# Patient Record
Sex: MALE | URBAN - METROPOLITAN AREA
[De-identification: names, ages, dates, MRNs, and addresses within clinical notes are randomized per-mention and may not be internally consistent; named-entity substitution may affect disease eponyms.]

---

## 2021-05-16 ENCOUNTER — HOSPITAL ENCOUNTER (EMERGENCY)
Age: 31
Discharge: HOME HEALTH | End: 2021-05-16

## 2021-05-16 VITALS — TEMPERATURE: 97.4 F

## 2023-11-17 ENCOUNTER — APPOINTMENT (OUTPATIENT)
Dept: RADIOLOGY | Facility: HOSPITAL | Age: 33
DRG: 322 | End: 2023-11-17
Payer: COMMERCIAL

## 2023-11-17 ENCOUNTER — HOSPITAL ENCOUNTER (INPATIENT)
Facility: HOSPITAL | Age: 33
LOS: 4 days | Discharge: HOME | DRG: 322 | End: 2023-11-21
Attending: STUDENT IN AN ORGANIZED HEALTH CARE EDUCATION/TRAINING PROGRAM | Admitting: STUDENT IN AN ORGANIZED HEALTH CARE EDUCATION/TRAINING PROGRAM
Payer: COMMERCIAL

## 2023-11-17 DIAGNOSIS — I21.4 NSTEMI (NON-ST ELEVATED MYOCARDIAL INFARCTION) (MULTI): Primary | ICD-10-CM

## 2023-11-17 LAB
ALBUMIN SERPL BCP-MCNC: 4.5 G/DL (ref 3.4–5)
ALP SERPL-CCNC: 74 U/L (ref 33–120)
ALT SERPL W P-5'-P-CCNC: 57 U/L (ref 10–52)
ANION GAP SERPL CALC-SCNC: 14 MMOL/L (ref 10–20)
AST SERPL W P-5'-P-CCNC: 90 U/L (ref 9–39)
BASOPHILS # BLD AUTO: 0.08 X10*3/UL (ref 0–0.1)
BASOPHILS NFR BLD AUTO: 0.6 %
BILIRUB SERPL-MCNC: 0.6 MG/DL (ref 0–1.2)
BNP SERPL-MCNC: 153 PG/ML (ref 0–99)
BUN SERPL-MCNC: 19 MG/DL (ref 6–23)
CALCIUM SERPL-MCNC: 9 MG/DL (ref 8.6–10.3)
CARDIAC TROPONIN I PNL SERPL HS: 4042 NG/L (ref 0–20)
CARDIAC TROPONIN I PNL SERPL HS: 8304 NG/L (ref 0–20)
CHLORIDE SERPL-SCNC: 102 MMOL/L (ref 98–107)
CO2 SERPL-SCNC: 27 MMOL/L (ref 21–32)
CREAT SERPL-MCNC: 1.1 MG/DL (ref 0.5–1.3)
EOSINOPHIL # BLD AUTO: 0.26 X10*3/UL (ref 0–0.7)
EOSINOPHIL NFR BLD AUTO: 2 %
ERYTHROCYTE [DISTWIDTH] IN BLOOD BY AUTOMATED COUNT: 11.9 % (ref 11.5–14.5)
GFR SERPL CREATININE-BSD FRML MDRD: >90 ML/MIN/1.73M*2
GLUCOSE SERPL-MCNC: 92 MG/DL (ref 74–99)
HCT VFR BLD AUTO: 45.7 % (ref 41–52)
HGB BLD-MCNC: 15.5 G/DL (ref 13.5–17.5)
IMM GRANULOCYTES # BLD AUTO: 0.04 X10*3/UL (ref 0–0.7)
IMM GRANULOCYTES NFR BLD AUTO: 0.3 % (ref 0–0.9)
LIPASE SERPL-CCNC: 3 U/L (ref 9–82)
LYMPHOCYTES # BLD AUTO: 2.33 X10*3/UL (ref 1.2–4.8)
LYMPHOCYTES NFR BLD AUTO: 17.8 %
MAGNESIUM SERPL-MCNC: 2.02 MG/DL (ref 1.6–2.4)
MCH RBC QN AUTO: 29.4 PG (ref 26–34)
MCHC RBC AUTO-ENTMCNC: 33.9 G/DL (ref 32–36)
MCV RBC AUTO: 87 FL (ref 80–100)
MONOCYTES # BLD AUTO: 0.98 X10*3/UL (ref 0.1–1)
MONOCYTES NFR BLD AUTO: 7.5 %
NEUTROPHILS # BLD AUTO: 9.42 X10*3/UL (ref 1.2–7.7)
NEUTROPHILS NFR BLD AUTO: 71.8 %
NRBC BLD-RTO: 0 /100 WBCS (ref 0–0)
PLATELET # BLD AUTO: 300 X10*3/UL (ref 150–450)
POTASSIUM SERPL-SCNC: 3.5 MMOL/L (ref 3.5–5.3)
PROT SERPL-MCNC: 7.4 G/DL (ref 6.4–8.2)
RBC # BLD AUTO: 5.27 X10*6/UL (ref 4.5–5.9)
SODIUM SERPL-SCNC: 139 MMOL/L (ref 136–145)
WBC # BLD AUTO: 13.1 X10*3/UL (ref 4.4–11.3)

## 2023-11-17 PROCEDURE — 99291 CRITICAL CARE FIRST HOUR: CPT | Performed by: STUDENT IN AN ORGANIZED HEALTH CARE EDUCATION/TRAINING PROGRAM

## 2023-11-17 PROCEDURE — 2500000004 HC RX 250 GENERAL PHARMACY W/ HCPCS (ALT 636 FOR OP/ED): Performed by: STUDENT IN AN ORGANIZED HEALTH CARE EDUCATION/TRAINING PROGRAM

## 2023-11-17 PROCEDURE — B2151ZZ FLUOROSCOPY OF LEFT HEART USING LOW OSMOLAR CONTRAST: ICD-10-PCS | Performed by: INTERNAL MEDICINE

## 2023-11-17 PROCEDURE — 36415 COLL VENOUS BLD VENIPUNCTURE: CPT | Performed by: PHYSICIAN ASSISTANT

## 2023-11-17 PROCEDURE — 83735 ASSAY OF MAGNESIUM: CPT | Performed by: PHYSICIAN ASSISTANT

## 2023-11-17 PROCEDURE — 2500000001 HC RX 250 WO HCPCS SELF ADMINISTERED DRUGS (ALT 637 FOR MEDICARE OP): Performed by: STUDENT IN AN ORGANIZED HEALTH CARE EDUCATION/TRAINING PROGRAM

## 2023-11-17 PROCEDURE — 4A023N7 MEASUREMENT OF CARDIAC SAMPLING AND PRESSURE, LEFT HEART, PERCUTANEOUS APPROACH: ICD-10-PCS | Performed by: INTERNAL MEDICINE

## 2023-11-17 PROCEDURE — 71045 X-RAY EXAM CHEST 1 VIEW: CPT | Performed by: STUDENT IN AN ORGANIZED HEALTH CARE EDUCATION/TRAINING PROGRAM

## 2023-11-17 PROCEDURE — 2060000001 HC INTERMEDIATE ICU ROOM DAILY

## 2023-11-17 PROCEDURE — 2500000004 HC RX 250 GENERAL PHARMACY W/ HCPCS (ALT 636 FOR OP/ED): Performed by: PHYSICIAN ASSISTANT

## 2023-11-17 PROCEDURE — B2111ZZ FLUOROSCOPY OF MULTIPLE CORONARY ARTERIES USING LOW OSMOLAR CONTRAST: ICD-10-PCS | Performed by: INTERNAL MEDICINE

## 2023-11-17 PROCEDURE — 83880 ASSAY OF NATRIURETIC PEPTIDE: CPT | Performed by: STUDENT IN AN ORGANIZED HEALTH CARE EDUCATION/TRAINING PROGRAM

## 2023-11-17 PROCEDURE — 02703ZZ DILATION OF CORONARY ARTERY, ONE ARTERY, PERCUTANEOUS APPROACH: ICD-10-PCS | Performed by: INTERNAL MEDICINE

## 2023-11-17 PROCEDURE — 85025 COMPLETE CBC W/AUTO DIFF WBC: CPT | Performed by: PHYSICIAN ASSISTANT

## 2023-11-17 PROCEDURE — 80053 COMPREHEN METABOLIC PANEL: CPT | Performed by: PHYSICIAN ASSISTANT

## 2023-11-17 PROCEDURE — 027034Z DILATION OF CORONARY ARTERY, ONE ARTERY WITH DRUG-ELUTING INTRALUMINAL DEVICE, PERCUTANEOUS APPROACH: ICD-10-PCS | Performed by: INTERNAL MEDICINE

## 2023-11-17 PROCEDURE — 71045 X-RAY EXAM CHEST 1 VIEW: CPT | Mod: FY

## 2023-11-17 PROCEDURE — 2500000005 HC RX 250 GENERAL PHARMACY W/O HCPCS: Performed by: PHYSICIAN ASSISTANT

## 2023-11-17 PROCEDURE — 96374 THER/PROPH/DIAG INJ IV PUSH: CPT

## 2023-11-17 PROCEDURE — 2500000001 HC RX 250 WO HCPCS SELF ADMINISTERED DRUGS (ALT 637 FOR MEDICARE OP)

## 2023-11-17 PROCEDURE — 84484 ASSAY OF TROPONIN QUANT: CPT | Performed by: PHYSICIAN ASSISTANT

## 2023-11-17 PROCEDURE — 83690 ASSAY OF LIPASE: CPT | Performed by: STUDENT IN AN ORGANIZED HEALTH CARE EDUCATION/TRAINING PROGRAM

## 2023-11-17 PROCEDURE — 96376 TX/PRO/DX INJ SAME DRUG ADON: CPT

## 2023-11-17 RX ORDER — HEPARIN SODIUM 5000 [USP'U]/ML
4000 INJECTION, SOLUTION INTRAVENOUS; SUBCUTANEOUS ONCE
Status: COMPLETED | OUTPATIENT
Start: 2023-11-17 | End: 2023-11-17

## 2023-11-17 RX ORDER — NITROGLYCERIN 0.4 MG/1
0.4 TABLET SUBLINGUAL ONCE
Status: COMPLETED | OUTPATIENT
Start: 2023-11-17 | End: 2023-11-17

## 2023-11-17 RX ORDER — HEPARIN SODIUM 10000 [USP'U]/100ML
0-4000 INJECTION, SOLUTION INTRAVENOUS CONTINUOUS
Status: DISCONTINUED | OUTPATIENT
Start: 2023-11-17 | End: 2023-11-18

## 2023-11-17 RX ORDER — IBUPROFEN 200 MG
4 TABLET ORAL EVERY 6 HOURS PRN
Status: ON HOLD | COMMUNITY
End: 2023-11-21 | Stop reason: WASHOUT

## 2023-11-17 RX ORDER — NAPROXEN SODIUM 220 MG/1
324 TABLET, FILM COATED ORAL ONCE
Status: COMPLETED | OUTPATIENT
Start: 2023-11-17 | End: 2023-11-17

## 2023-11-17 RX ORDER — ACETAMINOPHEN 325 MG/1
2-3 TABLET ORAL EVERY 6 HOURS PRN
Status: ON HOLD | COMMUNITY
End: 2024-03-09 | Stop reason: WASHOUT

## 2023-11-17 RX ORDER — POLYETHYLENE GLYCOL 3350 17 G/17G
17 POWDER, FOR SOLUTION ORAL DAILY PRN
Status: DISCONTINUED | OUTPATIENT
Start: 2023-11-17 | End: 2023-11-21 | Stop reason: HOSPADM

## 2023-11-17 RX ORDER — ACETAMINOPHEN 325 MG/1
975 TABLET ORAL EVERY 6 HOURS PRN
Status: DISCONTINUED | OUTPATIENT
Start: 2023-11-17 | End: 2023-11-21 | Stop reason: HOSPADM

## 2023-11-17 RX ORDER — ATORVASTATIN CALCIUM 40 MG/1
40 TABLET, FILM COATED ORAL NIGHTLY
Status: DISCONTINUED | OUTPATIENT
Start: 2023-11-17 | End: 2023-11-18

## 2023-11-17 RX ORDER — LOSARTAN POTASSIUM 50 MG/1
25 TABLET ORAL DAILY
Status: DISCONTINUED | OUTPATIENT
Start: 2023-11-18 | End: 2023-11-21 | Stop reason: HOSPADM

## 2023-11-17 RX ORDER — HEPARIN SODIUM 5000 [USP'U]/ML
4000 INJECTION, SOLUTION INTRAVENOUS; SUBCUTANEOUS ONCE
Status: DISCONTINUED | OUTPATIENT
Start: 2023-11-17 | End: 2023-11-20

## 2023-11-17 RX ORDER — HEPARIN SODIUM 10000 [USP'U]/100ML
0-4000 INJECTION, SOLUTION INTRAVENOUS CONTINUOUS
Status: DISCONTINUED | OUTPATIENT
Start: 2023-11-17 | End: 2023-11-20

## 2023-11-17 RX ORDER — HEPARIN SODIUM 5000 [USP'U]/ML
2000-4000 INJECTION, SOLUTION INTRAVENOUS; SUBCUTANEOUS EVERY 4 HOURS PRN
Status: DISCONTINUED | OUTPATIENT
Start: 2023-11-17 | End: 2023-11-18

## 2023-11-17 RX ORDER — NAPROXEN SODIUM 220 MG/1
81 TABLET, FILM COATED ORAL DAILY
Status: DISCONTINUED | OUTPATIENT
Start: 2023-11-18 | End: 2023-11-21 | Stop reason: HOSPADM

## 2023-11-17 RX ADMIN — ATORVASTATIN CALCIUM 40 MG: 40 TABLET, FILM COATED ORAL at 23:18

## 2023-11-17 RX ADMIN — TICAGRELOR 90 MG: 90 TABLET ORAL at 20:55

## 2023-11-17 RX ADMIN — DIPHENHYDRAMINE HYDROCHLORIDE AND LIDOCAINE HYDROCHLORIDE AND ALUMINUM HYDROXIDE AND MAGNESIUM HYDRO 10 ML: KIT at 19:05

## 2023-11-17 RX ADMIN — HEPARIN SODIUM 4000 UNITS: 5000 INJECTION INTRAVENOUS; SUBCUTANEOUS at 20:58

## 2023-11-17 RX ADMIN — NITROGLYCERIN 0.4 MG: 0.4 TABLET SUBLINGUAL at 20:57

## 2023-11-17 RX ADMIN — ASPIRIN 81 MG CHEWABLE TABLET 324 MG: 81 TABLET CHEWABLE at 20:55

## 2023-11-17 RX ADMIN — HEPARIN SODIUM 1000 UNITS/HR: 10000 INJECTION, SOLUTION INTRAVENOUS at 21:08

## 2023-11-17 SDOH — SOCIAL STABILITY: SOCIAL INSECURITY: ABUSE: ADULT

## 2023-11-17 SDOH — SOCIAL STABILITY: SOCIAL INSECURITY: HAS ANYONE EVER THREATENED TO HURT YOUR FAMILY OR YOUR PETS?: NO

## 2023-11-17 SDOH — SOCIAL STABILITY: SOCIAL INSECURITY: WERE YOU ABLE TO COMPLETE ALL THE BEHAVIORAL HEALTH SCREENINGS?: YES

## 2023-11-17 SDOH — SOCIAL STABILITY: SOCIAL INSECURITY: ARE THERE ANY APPARENT SIGNS OF INJURIES/BEHAVIORS THAT COULD BE RELATED TO ABUSE/NEGLECT?: NO

## 2023-11-17 SDOH — SOCIAL STABILITY: SOCIAL INSECURITY: DO YOU FEEL ANYONE HAS EXPLOITED OR TAKEN ADVANTAGE OF YOU FINANCIALLY OR OF YOUR PERSONAL PROPERTY?: NO

## 2023-11-17 SDOH — SOCIAL STABILITY: SOCIAL INSECURITY: DO YOU FEEL UNSAFE GOING BACK TO THE PLACE WHERE YOU ARE LIVING?: NO

## 2023-11-17 SDOH — SOCIAL STABILITY: SOCIAL INSECURITY: HAVE YOU HAD THOUGHTS OF HARMING ANYONE ELSE?: NO

## 2023-11-17 SDOH — SOCIAL STABILITY: SOCIAL INSECURITY: DOES ANYONE TRY TO KEEP YOU FROM HAVING/CONTACTING OTHER FRIENDS OR DOING THINGS OUTSIDE YOUR HOME?: NO

## 2023-11-17 SDOH — SOCIAL STABILITY: SOCIAL INSECURITY: ARE YOU OR HAVE YOU BEEN THREATENED OR ABUSED PHYSICALLY, EMOTIONALLY, OR SEXUALLY BY ANYONE?: NO

## 2023-11-17 ASSESSMENT — COLUMBIA-SUICIDE SEVERITY RATING SCALE - C-SSRS
1. IN THE PAST MONTH, HAVE YOU WISHED YOU WERE DEAD OR WISHED YOU COULD GO TO SLEEP AND NOT WAKE UP?: NO
2. HAVE YOU ACTUALLY HAD ANY THOUGHTS OF KILLING YOURSELF?: NO
6. HAVE YOU EVER DONE ANYTHING, STARTED TO DO ANYTHING, OR PREPARED TO DO ANYTHING TO END YOUR LIFE?: NO

## 2023-11-17 ASSESSMENT — LIFESTYLE VARIABLES
HAVE PEOPLE ANNOYED YOU BY CRITICIZING YOUR DRINKING: NO
AUDIT-C TOTAL SCORE: 0
HOW MANY STANDARD DRINKS CONTAINING ALCOHOL DO YOU HAVE ON A TYPICAL DAY: PATIENT DOES NOT DRINK
EVER HAD A DRINK FIRST THING IN THE MORNING TO STEADY YOUR NERVES TO GET RID OF A HANGOVER: NO
SKIP TO QUESTIONS 9-10: 1
HOW OFTEN DO YOU HAVE 6 OR MORE DRINKS ON ONE OCCASION: NEVER
HOW OFTEN DO YOU HAVE A DRINK CONTAINING ALCOHOL: NEVER
REASON UNABLE TO ASSESS: NO
AUDIT-C TOTAL SCORE: 0
EVER FELT BAD OR GUILTY ABOUT YOUR DRINKING: NO
HAVE YOU EVER FELT YOU SHOULD CUT DOWN ON YOUR DRINKING: NO

## 2023-11-17 ASSESSMENT — ENCOUNTER SYMPTOMS
HEADACHES: 0
COUGH: 0
ACTIVITY CHANGE: 0
DIARRHEA: 1
CHEST TIGHTNESS: 0
NUMBNESS: 0
BACK PAIN: 0
TREMORS: 0
SHORTNESS OF BREATH: 0
FEVER: 0
LIGHT-HEADEDNESS: 0
NAUSEA: 0
PALPITATIONS: 0
ABDOMINAL DISTENTION: 0
MYALGIAS: 0
FREQUENCY: 0
HALLUCINATIONS: 0
APPETITE CHANGE: 0

## 2023-11-17 ASSESSMENT — COGNITIVE AND FUNCTIONAL STATUS - GENERAL
MOBILITY SCORE: 24
DAILY ACTIVITIY SCORE: 24
PATIENT BASELINE BEDBOUND: NO

## 2023-11-17 ASSESSMENT — PAIN - FUNCTIONAL ASSESSMENT
PAIN_FUNCTIONAL_ASSESSMENT: 0-10
PAIN_FUNCTIONAL_ASSESSMENT: 0-10

## 2023-11-17 ASSESSMENT — PAIN DESCRIPTION - FREQUENCY: FREQUENCY: INTERMITTENT

## 2023-11-17 ASSESSMENT — PATIENT HEALTH QUESTIONNAIRE - PHQ9
1. LITTLE INTEREST OR PLEASURE IN DOING THINGS: NOT AT ALL
2. FEELING DOWN, DEPRESSED OR HOPELESS: NOT AT ALL
SUM OF ALL RESPONSES TO PHQ9 QUESTIONS 1 & 2: 0

## 2023-11-17 ASSESSMENT — ACTIVITIES OF DAILY LIVING (ADL)
JUDGMENT_ADEQUATE_SAFELY_COMPLETE_DAILY_ACTIVITIES: YES
GROOMING: INDEPENDENT
ADEQUATE_TO_COMPLETE_ADL: YES
HEARING - LEFT EAR: FUNCTIONAL
WALKS IN HOME: INDEPENDENT
DRESSING YOURSELF: INDEPENDENT
FEEDING YOURSELF: INDEPENDENT
PATIENT'S MEMORY ADEQUATE TO SAFELY COMPLETE DAILY ACTIVITIES?: YES
TOILETING: INDEPENDENT
HEARING - RIGHT EAR: FUNCTIONAL
BATHING: INDEPENDENT

## 2023-11-17 ASSESSMENT — PAIN SCALES - GENERAL
PAINLEVEL_OUTOF10: 6
PAINLEVEL_OUTOF10: 7
PAINLEVEL_OUTOF10: 7
PAINLEVEL_OUTOF10: 0 - NO PAIN

## 2023-11-17 ASSESSMENT — PAIN DESCRIPTION - PAIN TYPE: TYPE: ACUTE PAIN

## 2023-11-17 ASSESSMENT — PAIN DESCRIPTION - LOCATION: LOCATION: CHEST

## 2023-11-17 ASSESSMENT — PAIN DESCRIPTION - DESCRIPTORS
DESCRIPTORS: ACHING
DESCRIPTORS: ACHING;SHARP

## 2023-11-17 NOTE — Clinical Note
Vessel: circumflex (distal). Guidewire inserted and used as the primary guidewire crossing the lesion.

## 2023-11-17 NOTE — Clinical Note
Angioplasty of the circumflex lesion. Inflation 1: Pressure = 12 lisa; Duration = 20 sec. Inflation 2: Pressure = 12 lisa; Duration = 12 sec. Kissing balloons

## 2023-11-17 NOTE — Clinical Note
Angioplasty of the proximal left anterior descending lesion. Inflation 1: Pressure = 12 lisa; Duration = 20 sec. Inflation 2: Pressure = 14 lisa; Duration = 40 sec.

## 2023-11-17 NOTE — Clinical Note
Angioplasty of the distal circumflex lesion. Inflation 1: Pressure = 12 lisa; Duration = 20 sec. Inflation 2: Pressure = 12 lisa; Duration = 12 sec. Kissing balloons

## 2023-11-17 NOTE — Clinical Note
Angioplasty of the proximal left anterior descending lesion. Inflation 1: Pressure = 22 lisa; Duration = 20 sec. Inflation 2: Pressure = 22 lisa; Duration = 20 sec.

## 2023-11-17 NOTE — Clinical Note
Angioplasty of the proximal left anterior descending lesion. Inflation 1: Pressure = 20 lisa; Duration = 16 sec. Inflation 2: Pressure = 24 lisa; Duration = 20 sec.

## 2023-11-17 NOTE — Clinical Note
Angioplasty of the proximal left anterior descending lesion. Inflation 1: Pressure = 12 lisa; Duration = 12 sec. Inflation 2: Pressure = 12 lisa; Duration = 20 sec.

## 2023-11-18 LAB
ALBUMIN SERPL BCP-MCNC: 4.3 G/DL (ref 3.4–5)
ANION GAP SERPL CALC-SCNC: 14 MMOL/L (ref 10–20)
APTT PPP: 52 SECONDS (ref 27–38)
BUN SERPL-MCNC: 16 MG/DL (ref 6–23)
CALCIUM SERPL-MCNC: 8.8 MG/DL (ref 8.6–10.3)
CARDIAC TROPONIN I PNL SERPL HS: ABNORMAL NG/L (ref 0–20)
CHLORIDE SERPL-SCNC: 100 MMOL/L (ref 98–107)
CHOLEST SERPL-MCNC: 202 MG/DL (ref 0–199)
CHOLESTEROL/HDL RATIO: 6.3
CO2 SERPL-SCNC: 27 MMOL/L (ref 21–32)
CREAT SERPL-MCNC: 1.05 MG/DL (ref 0.5–1.3)
D DIMER PPP FEU-MCNC: 347 NG/ML FEU
ERYTHROCYTE [DISTWIDTH] IN BLOOD BY AUTOMATED COUNT: 11.8 % (ref 11.5–14.5)
EST. AVERAGE GLUCOSE BLD GHB EST-MCNC: 105 MG/DL
GFR SERPL CREATININE-BSD FRML MDRD: >90 ML/MIN/1.73M*2
GLUCOSE SERPL-MCNC: 105 MG/DL (ref 74–99)
HBA1C MFR BLD: 5.3 %
HCT VFR BLD AUTO: 45.4 % (ref 41–52)
HDLC SERPL-MCNC: 32.2 MG/DL
HGB BLD-MCNC: 15.1 G/DL (ref 13.5–17.5)
LDLC SERPL CALC-MCNC: 142 MG/DL
MAGNESIUM SERPL-MCNC: 2.04 MG/DL (ref 1.6–2.4)
MCH RBC QN AUTO: 29.3 PG (ref 26–34)
MCHC RBC AUTO-ENTMCNC: 33.3 G/DL (ref 32–36)
MCV RBC AUTO: 88 FL (ref 80–100)
NON HDL CHOLESTEROL: 170 MG/DL (ref 0–149)
NRBC BLD-RTO: 0 /100 WBCS (ref 0–0)
PHOSPHATE SERPL-MCNC: 3.3 MG/DL (ref 2.5–4.9)
PLATELET # BLD AUTO: 296 X10*3/UL (ref 150–450)
POTASSIUM SERPL-SCNC: 3.7 MMOL/L (ref 3.5–5.3)
RBC # BLD AUTO: 5.16 X10*6/UL (ref 4.5–5.9)
SODIUM SERPL-SCNC: 137 MMOL/L (ref 136–145)
TRIGL SERPL-MCNC: 141 MG/DL (ref 0–149)
TSH SERPL-ACNC: 0.5 MIU/L (ref 0.44–3.98)
UFH PPP CHRO-ACNC: 0.2 IU/ML
UFH PPP CHRO-ACNC: 0.2 IU/ML
UFH PPP CHRO-ACNC: 0.4 IU/ML
VLDL: 28 MG/DL (ref 0–40)
WBC # BLD AUTO: 13.5 X10*3/UL (ref 4.4–11.3)

## 2023-11-18 PROCEDURE — 2500000001 HC RX 250 WO HCPCS SELF ADMINISTERED DRUGS (ALT 637 FOR MEDICARE OP): Performed by: NURSE PRACTITIONER

## 2023-11-18 PROCEDURE — 2500000004 HC RX 250 GENERAL PHARMACY W/ HCPCS (ALT 636 FOR OP/ED): Performed by: NURSE PRACTITIONER

## 2023-11-18 PROCEDURE — 85613 RUSSELL VIPER VENOM DILUTED: CPT | Mod: GEALAB

## 2023-11-18 PROCEDURE — 83735 ASSAY OF MAGNESIUM: CPT

## 2023-11-18 PROCEDURE — 36415 COLL VENOUS BLD VENIPUNCTURE: CPT

## 2023-11-18 PROCEDURE — 85730 THROMBOPLASTIN TIME PARTIAL: CPT | Performed by: STUDENT IN AN ORGANIZED HEALTH CARE EDUCATION/TRAINING PROGRAM

## 2023-11-18 PROCEDURE — 84484 ASSAY OF TROPONIN QUANT: CPT

## 2023-11-18 PROCEDURE — 84100 ASSAY OF PHOSPHORUS: CPT

## 2023-11-18 PROCEDURE — 85027 COMPLETE CBC AUTOMATED: CPT

## 2023-11-18 PROCEDURE — 2500000004 HC RX 250 GENERAL PHARMACY W/ HCPCS (ALT 636 FOR OP/ED)

## 2023-11-18 PROCEDURE — 85520 HEPARIN ASSAY: CPT

## 2023-11-18 PROCEDURE — 2500000001 HC RX 250 WO HCPCS SELF ADMINISTERED DRUGS (ALT 637 FOR MEDICARE OP)

## 2023-11-18 PROCEDURE — 83036 HEMOGLOBIN GLYCOSYLATED A1C: CPT | Mod: GEALAB

## 2023-11-18 PROCEDURE — 83090 ASSAY OF HOMOCYSTEINE: CPT | Mod: GEALAB

## 2023-11-18 PROCEDURE — 2060000001 HC INTERMEDIATE ICU ROOM DAILY

## 2023-11-18 PROCEDURE — 36415 COLL VENOUS BLD VENIPUNCTURE: CPT | Performed by: STUDENT IN AN ORGANIZED HEALTH CARE EDUCATION/TRAINING PROGRAM

## 2023-11-18 PROCEDURE — 85379 FIBRIN DEGRADATION QUANT: CPT

## 2023-11-18 PROCEDURE — 99223 1ST HOSP IP/OBS HIGH 75: CPT

## 2023-11-18 PROCEDURE — 99223 1ST HOSP IP/OBS HIGH 75: CPT | Performed by: NURSE PRACTITIONER

## 2023-11-18 PROCEDURE — 80061 LIPID PANEL: CPT

## 2023-11-18 PROCEDURE — 84443 ASSAY THYROID STIM HORMONE: CPT

## 2023-11-18 RX ORDER — KETOROLAC TROMETHAMINE 30 MG/ML
30 INJECTION, SOLUTION INTRAMUSCULAR; INTRAVENOUS ONCE
Status: COMPLETED | OUTPATIENT
Start: 2023-11-18 | End: 2023-11-18

## 2023-11-18 RX ORDER — MORPHINE SULFATE 2 MG/ML
2 INJECTION, SOLUTION INTRAMUSCULAR; INTRAVENOUS ONCE
Status: COMPLETED | OUTPATIENT
Start: 2023-11-18 | End: 2023-11-18

## 2023-11-18 RX ORDER — POTASSIUM CHLORIDE 20 MEQ/1
20 TABLET, EXTENDED RELEASE ORAL ONCE
Status: COMPLETED | OUTPATIENT
Start: 2023-11-18 | End: 2023-11-18

## 2023-11-18 RX ORDER — ATORVASTATIN CALCIUM 80 MG/1
80 TABLET, FILM COATED ORAL NIGHTLY
Status: DISCONTINUED | OUTPATIENT
Start: 2023-11-18 | End: 2023-11-21 | Stop reason: HOSPADM

## 2023-11-18 RX ORDER — NITROGLYCERIN 0.4 MG/1
0.4 TABLET SUBLINGUAL ONCE
Status: COMPLETED | OUTPATIENT
Start: 2023-11-18 | End: 2023-11-18

## 2023-11-18 RX ORDER — ONDANSETRON HYDROCHLORIDE 2 MG/ML
4 INJECTION, SOLUTION INTRAVENOUS EVERY 8 HOURS PRN
Status: DISCONTINUED | OUTPATIENT
Start: 2023-11-18 | End: 2023-11-21 | Stop reason: HOSPADM

## 2023-11-18 RX ORDER — ONDANSETRON 4 MG/1
4 TABLET, ORALLY DISINTEGRATING ORAL EVERY 8 HOURS PRN
Status: DISCONTINUED | OUTPATIENT
Start: 2023-11-18 | End: 2023-11-21 | Stop reason: HOSPADM

## 2023-11-18 RX ORDER — METOPROLOL TARTRATE 25 MG/1
25 TABLET, FILM COATED ORAL 2 TIMES DAILY
Status: DISCONTINUED | OUTPATIENT
Start: 2023-11-18 | End: 2023-11-21 | Stop reason: HOSPADM

## 2023-11-18 RX ADMIN — ONDANSETRON 4 MG: 2 INJECTION INTRAMUSCULAR; INTRAVENOUS at 01:02

## 2023-11-18 RX ADMIN — ASPIRIN 81 MG CHEWABLE TABLET 81 MG: 81 TABLET CHEWABLE at 08:22

## 2023-11-18 RX ADMIN — TICAGRELOR 90 MG: 90 TABLET ORAL at 08:22

## 2023-11-18 RX ADMIN — KETOROLAC TROMETHAMINE 30 MG: 30 INJECTION, SOLUTION INTRAMUSCULAR; INTRAVENOUS at 10:41

## 2023-11-18 RX ADMIN — ATORVASTATIN CALCIUM 80 MG: 80 TABLET, FILM COATED ORAL at 20:45

## 2023-11-18 RX ADMIN — TICAGRELOR 90 MG: 90 TABLET ORAL at 20:45

## 2023-11-18 RX ADMIN — LOSARTAN POTASSIUM 25 MG: 50 TABLET, FILM COATED ORAL at 08:22

## 2023-11-18 RX ADMIN — METOPROLOL TARTRATE 25 MG: 25 TABLET, FILM COATED ORAL at 20:45

## 2023-11-18 RX ADMIN — MORPHINE SULFATE 2 MG: 2 INJECTION, SOLUTION INTRAMUSCULAR; INTRAVENOUS at 06:32

## 2023-11-18 RX ADMIN — HEPARIN SODIUM 1200 UNITS/HR: 10000 INJECTION, SOLUTION INTRAVENOUS at 21:15

## 2023-11-18 RX ADMIN — METOPROLOL TARTRATE 25 MG: 25 TABLET, FILM COATED ORAL at 10:41

## 2023-11-18 RX ADMIN — MORPHINE SULFATE 2 MG: 2 INJECTION, SOLUTION INTRAMUSCULAR; INTRAVENOUS at 01:48

## 2023-11-18 RX ADMIN — POTASSIUM CHLORIDE 20 MEQ: 1500 TABLET, EXTENDED RELEASE ORAL at 15:51

## 2023-11-18 RX ADMIN — NITROGLYCERIN 0.4 MG: 0.4 TABLET SUBLINGUAL at 01:02

## 2023-11-18 ASSESSMENT — COGNITIVE AND FUNCTIONAL STATUS - GENERAL
MOBILITY SCORE: 24
DAILY ACTIVITIY SCORE: 24

## 2023-11-18 ASSESSMENT — PAIN DESCRIPTION - DESCRIPTORS: DESCRIPTORS: ACHING;DULL

## 2023-11-18 ASSESSMENT — PAIN SCALES - GENERAL
PAINLEVEL_OUTOF10: 5 - MODERATE PAIN
PAINLEVEL_OUTOF10: 0 - NO PAIN
PAINLEVEL_OUTOF10: 3

## 2023-11-18 ASSESSMENT — ACTIVITIES OF DAILY LIVING (ADL)
LACK_OF_TRANSPORTATION: NO
LACK_OF_TRANSPORTATION: NO

## 2023-11-18 ASSESSMENT — PAIN - FUNCTIONAL ASSESSMENT: PAIN_FUNCTIONAL_ASSESSMENT: 0-10

## 2023-11-18 ASSESSMENT — PAIN DESCRIPTION - LOCATION: LOCATION: CHEST

## 2023-11-18 NOTE — CARE PLAN
The patient's goals for the shift include Pt will perform ADLs without pain.    The clinical goals for the shift include Patient will have decreased chest pain during shift.      Problem: Pain - Adult  Goal: Verbalizes/displays adequate comfort level or baseline comfort level  Outcome: Progressing     Problem: Safety - Adult  Goal: Free from fall injury  Outcome: Progressing     Problem: Discharge Planning  Goal: Discharge to home or other facility with appropriate resources  Outcome: Progressing     Problem: Chronic Conditions and Co-morbidities  Goal: Patient's chronic conditions and co-morbidity symptoms are monitored and maintained or improved  Outcome: Progressing

## 2023-11-18 NOTE — ED PROVIDER NOTES
HPI   Chief Complaint   Patient presents with    Chest Pain     Started at 0200. Nausea no vomiting. No radiation to other areas.        This is a 33-year-old male coming in for pain just inferior to the sternum.  He states that this started at 4:00 in the morning as a sharp pain.  It is alleviated some at 6am and then when he went to work at 4 PM his pain started to come back.  See MDM.      History provided by:  Patient                      No data recorded                Patient History   History reviewed. No pertinent past medical history.  History reviewed. No pertinent surgical history.  No family history on file.  Social History     Tobacco Use    Smoking status: Never    Smokeless tobacco: Never   Substance Use Topics    Alcohol use: Yes    Drug use: Never       Physical Exam   ED Triage Vitals [11/17/23 1825]   Temp Heart Rate Resp BP   36.5 °C (97.7 °F) 89 16 (!) 169/111      SpO2 Temp src Heart Rate Source Patient Position   98 % -- Monitor --      BP Location FiO2 (%)     -- --       Physical Exam  Vitals and nursing note reviewed.   Constitutional:       General: He is not in acute distress.     Appearance: Normal appearance. He is not ill-appearing or toxic-appearing.   HENT:      Head: Normocephalic and atraumatic.   Eyes:      Extraocular Movements: Extraocular movements intact.      Conjunctiva/sclera: Conjunctivae normal.      Pupils: Pupils are equal, round, and reactive to light.   Cardiovascular:      Rate and Rhythm: Regular rhythm.      Pulses: Normal pulses.      Heart sounds: Normal heart sounds.   Pulmonary:      Effort: Pulmonary effort is normal. No respiratory distress.      Breath sounds: Normal breath sounds.   Abdominal:      General: Abdomen is flat. There is no distension.      Tenderness: There is abdominal tenderness in the epigastric area.      Comments: Patient is tender to the epigastric area only.   Musculoskeletal:         General: Normal range of motion.      Cervical back:  Normal range of motion and neck supple.   Skin:     General: Skin is warm and dry.   Neurological:      General: No focal deficit present.      Mental Status: He is alert and oriented to person, place, and time.   Psychiatric:         Mood and Affect: Mood normal.         Behavior: Behavior normal.         Thought Content: Thought content normal.         ED Course & [facility]   ED Course as of 11/18/23 0309 Fri Nov 17, 2023 1955 EKG completed at 1838 on my interpretation shows a normal sinus rhythm with T wave depressions on leads II, III, aVF..  Of 146 ms with a QTc of 467 ms. [RS]   2005 The patient is a 33-year-old male with no past medical history presenting with chief complaint of chest pain.  Patient's pain was actually in his epigastric area.  He did have an episode of nausea associated with it.  Pain is sharp.  Worse with palpation.  He is a low risk heart score.  States the pain began around 2 AM and has been constant.  Denies any medications or eating anything suspicious or sick contacts.  Denies any drug use or alcohol use.  Denies any black or tarry stools or blood in the stool or vomiting.  He has no short of breath no pain actually in the chest only in the epigastric area.   [WL]   2012 Troponin I, High Sensitivity(!!): 8,304 [WL]   2118 Patient reports his chest pain has greatly improved.  Hospitalist staff at bedside [RS]      ED Course User Index  [RS] Guzman Alford PA-C  [WL] Gurinder Brewer DO         Diagnoses as of 11/18/23 0309   NSTEMI (non-ST elevated myocardial infarction) (CMS/[facility])       Medical Decision Making  Summary:  Medical Decision Making:   Patient presented as described in HPI. Patient case including ROS, PE, and treatment and plan discussed with ED attending if attached as cosigner. Results from labs and or imaging included below if completed. Narciso Coates  is a 33 y.o. coming in for Patient presents with:  Chest Pain: Started at 0200. Nausea no vomiting. No radiation to other  areas.   . This is a 33-year-old male coming in for pain just inferior to the sternum.  He states that this started at 4:00 in the morning as a sharp pain.  It is alleviated some at 6am and then when he went to work at 4 PM his pain started to come back.  He states that the pain is worse to touch in the area.  He does have a history of hypertension but is not on medications for it.  He denies any other medical problems including prior history of cardiac disease or MI.  He is not diabetic.  Patient did not take anything for symptoms.  The patient's complaint work-up was completed.  He was given Magic mouthwash secondary to the pain at the epigastric area that is worse on palpation.  Lab work did show an elevated troponin greater than 8000.  Cardiology was immediately consulted.  Advised to aspirin heparin Brilinta and nitro.  These were given.  Patient's pain improved.  Advised admit to the hospitalist.  Hospital excepted patient to the stepdown unit.  He will continue to be monitored and reassessed.    I also explained the plan and treatment course. Patient/guardian is in agreement with plan, treatment course, and states verbally that they will comply.    Labs Reviewed  CBC WITH AUTO DIFFERENTIAL - Abnormal     WBC                           13.1 (*)               nRBC                          0.0                    RBC                           5.27                   Hemoglobin                    15.5                   Hematocrit                    45.7                   MCV                           87                     MCH                           29.4                   MCHC                          33.9                   RDW                           11.9                   Platelets                     300                    Neutrophils %                 71.8                   Immature Granulocytes %, Automated   0.3                    Lymphocytes %                 17.8                   Monocytes %                    7.5                    Eosinophils %                 2.0                    Basophils %                   0.6                    Neutrophils Absolute          9.42 (*)               Immature Granulocytes Absolute, Au*   0.04                   Lymphocytes Absolute          2.33                   Monocytes Absolute            0.98                   Eosinophils Absolute          0.26                   Basophils Absolute            0.08                COMPREHENSIVE METABOLIC PANEL - Abnormal     Glucose                       92                     Sodium                        139                    Potassium                     3.5                    Chloride                      102                    Bicarbonate                   27                     Anion Gap                     14                     Urea Nitrogen                 19                     Creatinine                    1.10                   eGFR                          >90                    Calcium                       9.0                    Albumin                       4.5                    Alkaline Phosphatase          74                     Total Protein                 7.4                    AST                           90 (*)                 Bilirubin, Total              0.6                    ALT                           57 (*)              SERIAL TROPONIN-INITIAL - Abnormal     Troponin I, High Sensitivity   8,304 (*)                 Narrative: Less than 99th percentile of normal range cutoff-                Female and children under 18 years old <14 ng/L; Male <21 ng/L: Negative                Repeat testing should be performed if clinically indicated.                                 Female and children under 18 years old 14-50 ng/L; Male 21-50 ng/L:                Consistent with possible cardiac damage and possible increased clinical                 risk. Serial measurements may help to assess extent of myocardial damage.                                  >50 ng/L: Consistent with cardiac damage, increased clinical risk and                myocardial infarction. Serial measurements may help assess extent of                 myocardial damage.                                  NOTE: Children less than 1 year old may have higher baseline troponin                 levels and results should be interpreted in conjunction with the overall                 clinical context.                                 NOTE: Troponin I testing is performed using a different                 testing methodology at [facility] than at other                 [facility]. Direct result comparisons should only                 be made within the same method.  MAGNESIUM - Normal     Magnesium                     2.02                TROPONIN SERIES- (INITIAL, 1 HR)       Narrative: The following orders were created for panel order Troponin I Series, High Sensitivity (0, 1 HR).                Procedure                               Abnormality         Status                                   ---------                               -----------         ------                                   Troponin I, High Sensiti...[[id number]]  Abnormal            Final result                             Troponin, High Sensitivi...[[id number]]                      In process                                               Please view results for these tests on the individual orders.  SERIAL TROPONIN, 1 HOUR  LIPASE  DRUG SCREEN,URINE   XR chest 1 view   Final Result    1.  No evidence of acute cardiopulmonary process.                MACRO:    None          Signed by: Yaw Alba 11/17/2023 6:47 PM    Dictation workstation:   [id number]          Tests/Medications/Escalations of Care considered but not given: As in MDM    Patient care discussed with: N/A  Social Determinants affecting care: N/A    Final diagnosis and disposition as documented       Hospitalize. I  discussed the differential; results and admit plan with the patient and/or family/friend/caregiver if present.  I emphasized the importance of hospitalization need for re-evaluation/continued monitoring/interventions.. They agreed that if they feel their condition is worsening or if they have any other concern they should alert staff immediately for further assistance. I gave the patient an opportunity to ask all questions they had and answered all of them accordingly. The patient and/or family/friend/caregiver expressed understanding verbally and that they would comply.    Disposition:  Hospitalize to Stepdown floor under Dr. Riley per their orders. Discussed findings and treatment done here in ED with admitting physician. It would be a risk to discharge the patient in their condition due to possibility of deterioration in their condition and the need for urgent interventions.    This note has been transcribed using voice recognition and may contain grammatical errors, misplaced words, incorrect words, incorrect phrases or other errors.          Procedure  Critical Care    Performed by: Gurinder Brewer DO  Authorized by: Gurinder Brewer DO    Critical care provider statement:     Critical care time (minutes):  31    Critical care time was exclusive of:  Separately billable procedures and treating other patients and teaching time    Critical care was necessary to treat or prevent imminent or life-threatening deterioration of the following conditions: NSTEMI.    Critical care was time spent personally by me on the following activities:  Ordering and performing treatments and interventions, ordering and review of laboratory studies, ordering and review of radiographic studies, pulse oximetry, re-evaluation of patient's condition, review of old charts, discussions with consultants, evaluation of patient's response to treatment and examination of patient    Care discussed with: admitting provider         Guzman Alford,  DAVIN  11/17/23 2043       Gurinder Brewer DO  11/18/23 0310    NELLA MACHADO DO, attests all medical record entries made are under my direction and personally dictated by me.    I interpreted all diagnostic tests    EKG was interpreted by me and documented by midlevel.    I have personally performed a substantive portion of the encounter. I saw and evaluated the patient.  I personally obtained the key and critical portions of the history and physical exam or was physically present for key and critical portions performed     ED Course as of 11/18/23 0310 Fri Nov 17, 2023 1955 EKG completed at 1838 on my interpretation shows a normal sinus rhythm with T wave depressions on leads II, III, aVF..  Of 146 ms with a QTc of 467 ms. [RS]   2005 The patient is a 33-year-old male with no past medical history presenting with chief complaint of chest pain.  Patient's pain was actually in his epigastric area.  He did have an episode of nausea associated with it.  Pain is sharp.  Worse with palpation.  He is a low risk heart score.  States the pain began around 2 AM and has been constant.  Denies any medications or eating anything suspicious or sick contacts.  Denies any drug use or alcohol use.  Denies any black or tarry stools or blood in the stool or vomiting.  He has no short of breath no pain actually in the chest only in the epigastric area.   [WL]   2012 Troponin I, High Sensitivity(!!): 8,304 [WL]   2118 Patient reports his chest pain has greatly improved.  Hospitalist staff at bedside [RS]      ED Course User Index  [RS] Guzman Alford PA-C  [WL] Gurinder Brewer DO         Diagnoses as of 11/18/23 0310   NSTEMI (non-ST elevated myocardial infarction) (CMS/[facility])     Troponin downward trending.  Was chest pain-free prior to admission.  He was started on heparin for NSTEMI.  Cardiology was consulted      Patient at bedside and discussed results and they are agreeable with the plan.    I have reviewed the chart and  changes were made to  reflect my performance of the history, physical, and assessment and plan.    Note: This note was dictated by speech recognition. Minor errors in transcription may be present         Gurinder Brewer DO  11/18/23 0315

## 2023-11-18 NOTE — PROGRESS NOTES
11/18/23 1404   Discharge Planning   Living Arrangements Spouse/significant other   Support Systems Spouse/significant other   Assistance Needed home with spouse, x3, independent with ADL's, drives, room air   Type of Residence Private residence   Number of Stairs to Enter Residence 4   Number of Stairs Within Residence 14   Do you have animals or pets at home? Yes   Type of Animals or Pets 2 cats   Who is requesting discharge planning? Provider   Home or Post Acute Services None   Patient expects to be discharged to: home with spouse, denies any c needs.   Does the patient need discharge transport arranged? No   Financial Resource Strain   How hard is it for you to pay for the very basics like food, housing, medical care, and heating? Not hard   Housing Stability   In the last 12 months, was there a time when you were not able to pay the mortgage or rent on time? N   In the last 12 months, how many places have you lived? 1   Transportation Needs   In the past 12 months, has lack of transportation kept you from medical appointments or from getting medications? no   In the past 12 months, has lack of transportation kept you from meetings, work, or from getting things needed for daily living? No

## 2023-11-18 NOTE — H&P
Internal Medicine - History and Physical Note      Patient: Narciso Coates, Age: 33 y.o., SEX: male , MRN:[id number], ROOM:[facility],  Code: Full Code   Admitted On: 11/17/2023   Admitting Dx: NSTEMI (non-ST elevated myocardial infarction) (CMS/[facility]) [I21.4]  PCP: Eli Olmos, APRN-CNP        Attending: Jonnie Riley MD        Chief Complaint   Patient: Narciso Coates is a 33 y.o. male who presented to the hospital for   Chief Complaint   Patient presents with    Chest Pain     Started at 0200. Nausea no vomiting. No radiation to other areas.      HPI   HPI: Narciso Coates is a 33 y.o. year old male  patient with no PMHx came to [facility] secondary to chest pain.  Starting last night at around 2 AM patient states that he developed substernal chest pain.  Pain was sharp in nature, 10/10 in intensity, substernal in location, nonradiating last a while and then subside.  Patient had 2 other such episodes sharp pain throughout the day, 1 at 6 AM and 1 at 4 PM.  During the 3 episodes patient states that chest pain did not completely resolve but was no longer sharp in intensity.  There was dull pressure behind his chest throughout the day.  Denies any nausea, vomiting, heartburn, leg swelling or lightheadedness.  Does not worsen with movement or deep inspiration. Did not have any previous history of such event in himself or his family.  Also denies any personal history of diabetes or hypertension.    Upon chart review, a note from PCP in March, 2020 was reviewed. It appeared that patient had hyperlipidemia that was not treated for. His Lipid panel from 03/03/2023 showed cholesterol of 235, LDL of 170, HDL of 34.2, Tgs of 154. Also has h/o elevated blood pressure from the same visit in 150/94.    ROS  Review of Systems   Constitutional:  Negative for activity change, appetite change and fever.   Respiratory:  Negative for cough, chest tightness and shortness of breath.    Cardiovascular:  Positive for chest pain.  Negative for palpitations and leg swelling.   Gastrointestinal:  Positive for diarrhea (Has had diarrhea for a long time, watery in nature, 2 episodes during the episode). Negative for abdominal distention and nausea.   Genitourinary:  Negative for frequency.   Musculoskeletal:  Negative for back pain and myalgias.   Neurological:  Negative for tremors, light-headedness, numbness and headaches.   Psychiatric/Behavioral:  Negative for behavioral problems and hallucinations.       12 Point ROS negative unless otherwise specified above.     ED COURSE:   VS: Temperature 97.7 F, /111 mmHg, heart rate 89 bpm, respiration 16, O2 sat 98%    Labs  - CBC WBC 13.1, RBC 5.27, H/H 15.5/45.7, Plt 300  - BMP Glu 92, Na 139, K 3.5, Cl 102, Bicarb 27, BUN 19, Cr 1.10  - LFTs: Alb 4.5, ALT 57, AST 90, T. Isrrael 0.6  - Lipase 3  -   - Trop I 8304 --> 4042  - EKG shows a normal sinus rhythm with T wave depressions on leads II, III, aVF, QTc of 467 m     Imaging:  CXR: Cardiomediastinal silhouette is normal in size and configuration. No consolidation, pleural effusion, or pneumothorax is evident.    Interventions: Consulted cardiology. Given brilinta 90 mg, nitroglycerin 0.4 mg once, aspirin 324 mg once.  Pt 's pain improved and he was subsequently transferred to step down    Past Medical History: None    Past Surgical History: Cholecystectomy at the age of 17 yrs    Allergies: NKDA    Home Medications: None    Family History: Stroke in mother    Social History:  - Coming from Home, lives with tiffany Mayo  - Tobacco: Vaped for 2 to 3 yrs, stopped currently  - Alcohol: Occasional, social  - Illicit Drug: Uses marijuana occasionally, last use 6 months ago    HealthCare Providers:  - PCP: Last saw a healthcare provider  in 2020.    Code Status: Full Code    Emergency contact: Maria Esther Judge, [phone]    Past Medical History     Past Medical History:   Diagnosis Date    Hypertension         Surgical History      Past Surgical History:   Procedure Laterality Date    ABDOMINAL SURGERY      gallbladder removal       Family History   No family history on file.    Social History     Social History     Socioeconomic History    Marital status: Single     Spouse name: Not on file    Number of children: Not on file    Years of education: Not on file    Highest education level: Not on file   Occupational History    Not on file   Tobacco Use    Smoking status: Never    Smokeless tobacco: Former     Types: Chew   Substance and Sexual Activity    Alcohol use: Not Currently    Drug use: Never    Sexual activity: Defer   Other Topics Concern    Not on file   Social History Narrative    Not on file     Social Determinants of Health     Financial Resource Strain: Not on file   Food Insecurity: Not on file   Transportation Needs: Not on file   Physical Activity: Not on file   Stress: Not on file   Social Connections: Not on file   Intimate Partner Violence: Not on file   Housing Stability: Not on file       Tobacco Use: Medium Risk (11/17/2023)    Patient History     Smoking Tobacco Use: Never     Smokeless Tobacco Use: Former     Passive Exposure: Not on file        Social History     Substance and Sexual Activity   Alcohol Use Not Currently        Allergies   No Known Allergies       Meds    Scheduled medications  [START ON 11/18/2023] aspirin, 81 mg, oral, Daily  atorvastatin, 40 mg, oral, Nightly  heparin, 4,000 Units, intravenous, Once  [START ON 11/18/2023] losartan, 25 mg, oral, Daily  perflutren lipid microspheres, 0.5-10 mL of dilution, intravenous, Once in imaging  perflutren protein A microsphere, 0.5 mL, intravenous, Once in imaging  sulfur hexafluoride microsphr, 2 mL, intravenous, Once in imaging  ticagrelor, 90 mg, oral, BID  ticagrelor, 90 mg, oral, BID      Continuous medications  heparin, 0-4,000 Units/hr, Last Rate: 1,000 Units/hr (11/17/23 2108)  heparin, 0-4,000 Units/hr      PRN medications  PRN medications:  "acetaminophen, heparin, polyethylene glycol     Objective    Physical Exam  Constitutional:       Appearance: Normal appearance.   HENT:      Head: Normocephalic and atraumatic.      Mouth/Throat:      Mouth: Mucous membranes are moist.   Cardiovascular:      Rate and Rhythm: Normal rate and regular rhythm.      Heart sounds: No murmur heard.     No friction rub. No gallop.   Pulmonary:      Breath sounds: Normal breath sounds. No stridor. No wheezing or rhonchi.   Abdominal:      General: Bowel sounds are normal. There is distension.      Palpations: Abdomen is soft. There is no mass.      Tenderness: There is no abdominal tenderness. There is no guarding.   Musculoskeletal:         General: No swelling or tenderness. Normal range of motion.      Cervical back: Normal range of motion.   Neurological:      General: No focal deficit present.      Mental Status: He is alert and oriented to person, place, and time.   Psychiatric:         Mood and Affect: Mood normal.         Behavior: Behavior normal.          Visit Vitals  /83 (BP Location: Right arm)   Pulse 84   Temp 36 °C (96.8 °F) (Tympanic)   Resp 18   Ht 1.778 m (5' 10\")   Wt 113 kg (249 lb 9 oz)   SpO2 98%   BMI 35.81 kg/m²   Smoking Status Never   BSA 2.36 m²        No intake or output data in the 24 hours ending 11/17/23 2251          Labs:   Results from last 72 hours   Lab Units 11/17/23  1828   SODIUM mmol/L 139   POTASSIUM mmol/L 3.5   CHLORIDE mmol/L 102   CO2 mmol/L 27   BUN mg/dL 19   CREATININE mg/dL 1.10   GLUCOSE mg/dL 92   CALCIUM mg/dL 9.0   ANION GAP mmol/L 14   EGFR mL/min/1.73m*2 >90      Results from last 72 hours   Lab Units 11/17/23  1828   WBC AUTO x10*3/uL 13.1*   HEMOGLOBIN g/dL 15.5   HEMATOCRIT % 45.7   PLATELETS AUTO x10*3/uL 300   NEUTROS PCT AUTO % 71.8   LYMPHS PCT AUTO % 17.8   MONOS PCT AUTO % 7.5   EOS PCT AUTO % 2.0      Lab Results   Component Value Date    CALCIUM 9.0 11/17/2023      No results found for: \"CRP\"    " "[unfilled]     Micro/ID:   No results found for the last 90 days.    No results found for: \"URINECULTURE\", \"BLOODCULT\", \"CSFCULTSMEAR\"                 No lab exists for component: \"AGALPCRNB\"       Images    XR chest 1 view  Narrative: Interpreted By:  Yaw Alba,   STUDY:  XR CHEST 1 VIEW;  11/17/2023 6:32 pm      INDICATION:  Signs/Symptoms:Chest Pain.      COMPARISON:  None.      ACCESSION NUMBER(S):  [id number]      ORDERING CLINICIAN:  JAYLYN ARGUETA      FINDINGS:  AP radiograph of the chest was provided.              CARDIOMEDIASTINAL SILHOUETTE:  Cardiomediastinal silhouette is normal in size and configuration.      LUNGS:  No consolidation, pleural effusion, or pneumothorax is evident.      ABDOMEN:  No remarkable upper abdominal findings.      BONES:  No acute osseous changes.      Impression: 1.  No evidence of acute cardiopulmonary process.          MACRO:  None      Signed by: Yaw Alba 11/17/2023 6:47 PM  Dictation workstation:   [id number]     No results found for this or any previous visit (from the past 4464 hour(s)).   No results found for this or any previous visit (from the past 4464 hour(s)).     Assessment and Plan    Narciso Coates is a 33 y.o. male admitted on 11/17/2023 for chest pain.  ED worked patient up.  Troponins were elevated.  EKG with T wave inversion.  Cardiology was consulted in the ED.  Recommended management per NSTEMI protocol.  Currently being worked up for the possible cause of NSTEMI in 33-year old    ACUTE MEDICAL ISSUES:    # NSTEMI r/o  # Elevated Troponin  - Sx: Substernal non-radiating chest pain  - Troponin: 8304 --> 4042. Repeat ordered  - EKG: T wave inversion in I, V1, V2, V3   - Heart score: 4 point; risk of MACE of 12-16%  - PRAKASH Score: 2 points; 8% all cause mortality  - CXR: No evidence of acute cardiopulmonary process  [ ] HbA1c ordered; pending  [ ] TSH ordered; pending  [ ] Lipid panel ordered; pending  [ ] UDS ordered; pending  [ " ] D-Dimer ordered; pending  [ ] Echo ordered; pending  -  mg once given in ED, On ASA 81 mg PO daily  - On Brillinta 90 mg BID  - Atorvastatin 40 mg at bedtime  - Added Losartan 25 mg every day  - On continuous low intensity heparin gtt per ACS guidelines  - Pt. Currently on telemetry in Stepdown  - Consulted cardiologist for further evaluation and guidance on diagnostic testing.  - Follow up outpatient w/ cardiology     IVF: None  Electrolytes: Mg >2, K >4  Diet: NPO currently for possible cath tomorrow  GI ppx: None  DVT ppx: On continuous low intensity Heparin gtt  Antibiotics: None  Lines:   Code: Full Code     Disposition: Admitted for NSTEMI, echo pending. eLOS >48Hr    Jorge Adames MD  Internal Medicine, PGY- 1  11/17/23 at 10:51 PM     Disclaimer: Documentation completed with the information available at the time of input. The times in the chart may not be reflective of actual patient care times, interventions, or procedures. Documentation occurs after the physical care of the patient.

## 2023-11-18 NOTE — PROGRESS NOTES
"Narciso Coates is a 33 y.o. male on day 1 of admission presenting with NSTEMI (non-ST elevated myocardial infarction) (CMS/[facility]).    Subjective     Patient doing well this morning. Notes he just has mild dull chest pressure and rates it as 4/10 in severity. Denies any family history of premature cardiac death. States that his mother had a stroke at 48.        Objective     Physical Exam  Constitutional:       Appearance: Normal appearance.   Eyes:      Extraocular Movements: Extraocular movements intact.      Conjunctiva/sclera: Conjunctivae normal.   Cardiovascular:      Rate and Rhythm: Normal rate and regular rhythm.   Pulmonary:      Effort: Pulmonary effort is normal. No respiratory distress.      Breath sounds: No wheezing, rhonchi or rales.   Abdominal:      Palpations: Abdomen is soft.      Tenderness: There is no abdominal tenderness.   Musculoskeletal:         General: Normal range of motion.   Skin:     General: Skin is warm and dry.   Neurological:      General: No focal deficit present.      Mental Status: He is alert and oriented to person, place, and time. Mental status is at baseline.   Psychiatric:         Mood and Affect: Mood normal.         Behavior: Behavior normal.         Last Recorded Vitals  Blood pressure 117/81, pulse 85, temperature 36.4 °C (97.5 °F), temperature source Temporal, resp. rate 17, height 1.778 m (5' 10\"), weight 113 kg (249 lb 9 oz), SpO2 94 %.  Intake/Output last 3 Shifts:  I/O last 3 completed shifts:  In: 323.7 (2.9 mL/kg) [P.O.:240; I.V.:83.7 (0.7 mL/kg)]  Out: - (0 mL/kg)   Weight: 113.2 kg     Relevant Results    No current facility-administered medications on file prior to encounter.     Current Outpatient Medications on File Prior to Encounter   Medication Sig Dispense Refill    acetaminophen (Tylenol) 325 mg tablet Take 2-3 tablets (650-975 mg) by mouth every 6 hours if needed for mild pain (1 - 3).      ibuprofen 200 mg tablet Take 4 tablets (800 mg) by mouth every " 6 hours if needed for mild pain (1 - 3).        Results for orders placed or performed during the hospital encounter of 11/17/23 (from the past 24 hour(s))   CBC and Auto Differential   Result Value Ref Range    WBC 13.1 (H) 4.4 - 11.3 x10*3/uL    nRBC 0.0 0.0 - 0.0 /100 WBCs    RBC 5.27 4.50 - 5.90 x10*6/uL    Hemoglobin 15.5 13.5 - 17.5 g/dL    Hematocrit 45.7 41.0 - 52.0 %    MCV 87 80 - 100 fL    MCH 29.4 26.0 - 34.0 pg    MCHC 33.9 32.0 - 36.0 g/dL    RDW 11.9 11.5 - 14.5 %    Platelets 300 150 - 450 x10*3/uL    Neutrophils % 71.8 40.0 - 80.0 %    Immature Granulocytes %, Automated 0.3 0.0 - 0.9 %    Lymphocytes % 17.8 13.0 - 44.0 %    Monocytes % 7.5 2.0 - 10.0 %    Eosinophils % 2.0 0.0 - 6.0 %    Basophils % 0.6 0.0 - 2.0 %    Neutrophils Absolute 9.42 (H) 1.20 - 7.70 x10*3/uL    Immature Granulocytes Absolute, Automated 0.04 0.00 - 0.70 x10*3/uL    Lymphocytes Absolute 2.33 1.20 - 4.80 x10*3/uL    Monocytes Absolute 0.98 0.10 - 1.00 x10*3/uL    Eosinophils Absolute 0.26 0.00 - 0.70 x10*3/uL    Basophils Absolute 0.08 0.00 - 0.10 x10*3/uL   Comprehensive Metabolic Panel   Result Value Ref Range    Glucose 92 74 - 99 mg/dL    Sodium 139 136 - 145 mmol/L    Potassium 3.5 3.5 - 5.3 mmol/L    Chloride 102 98 - 107 mmol/L    Bicarbonate 27 21 - 32 mmol/L    Anion Gap 14 10 - 20 mmol/L    Urea Nitrogen 19 6 - 23 mg/dL    Creatinine 1.10 0.50 - 1.30 mg/dL    eGFR >90 >60 mL/min/1.73m*2    Calcium 9.0 8.6 - 10.3 mg/dL    Albumin 4.5 3.4 - 5.0 g/dL    Alkaline Phosphatase 74 33 - 120 U/L    Total Protein 7.4 6.4 - 8.2 g/dL    AST 90 (H) 9 - 39 U/L    Bilirubin, Total 0.6 0.0 - 1.2 mg/dL    ALT 57 (H) 10 - 52 U/L   Magnesium   Result Value Ref Range    Magnesium 2.02 1.60 - 2.40 mg/dL   Troponin I, High Sensitivity, Initial   Result Value Ref Range    Troponin I, High Sensitivity 8,304 (HH) 0 - 20 ng/L   B-type natriuretic peptide   Result Value Ref Range     (H) 0 - 99 pg/mL   Troponin, High Sensitivity, 1  Hour   Result Value Ref Range    Troponin I, High Sensitivity 4,042 (HH) 0 - 20 ng/L   Lipase   Result Value Ref Range    Lipase 3 (L) 9 - 82 U/L   APTT   Result Value Ref Range    aPTT 52 (H) 27 - 38 seconds   D-dimer, Non VTE   Result Value Ref Range    D-Dimer Non VTE, Quant (ng/mL FEU) 347 <=500 ng/mL FEU   TSH   Result Value Ref Range    Thyroid Stimulating Hormone 0.50 0.44 - 3.98 mIU/L   Troponin I, High Sensitivity, Initial   Result Value Ref Range    Troponin I, High Sensitivity 14,459 (HH) 0 - 20 ng/L   Lipid Panel   Result Value Ref Range    Cholesterol 202 (H) 0 - 199 mg/dL    HDL-Cholesterol 32.2 mg/dL    Cholesterol/HDL Ratio 6.3     LDL Calculated 142 (H) <=99 mg/dL    VLDL 28 0 - 40 mg/dL    Triglycerides 141 0 - 149 mg/dL    Non HDL Cholesterol 170 (H) 0 - 149 mg/dL   Heparin Assay, UFH   Result Value Ref Range    Heparin Unfractionated 0.4 See Comment Below for Therapeutic Ranges IU/mL   Renal Function Panel   Result Value Ref Range    Glucose 105 (H) 74 - 99 mg/dL    Sodium 137 136 - 145 mmol/L    Potassium 3.7 3.5 - 5.3 mmol/L    Chloride 100 98 - 107 mmol/L    Bicarbonate 27 21 - 32 mmol/L    Anion Gap 14 10 - 20 mmol/L    Urea Nitrogen 16 6 - 23 mg/dL    Creatinine 1.05 0.50 - 1.30 mg/dL    eGFR >90 >60 mL/min/1.73m*2    Calcium 8.8 8.6 - 10.3 mg/dL    Phosphorus 3.3 2.5 - 4.9 mg/dL    Albumin 4.3 3.4 - 5.0 g/dL   CBC   Result Value Ref Range    WBC 13.5 (H) 4.4 - 11.3 x10*3/uL    nRBC 0.0 0.0 - 0.0 /100 WBCs    RBC 5.16 4.50 - 5.90 x10*6/uL    Hemoglobin 15.1 13.5 - 17.5 g/dL    Hematocrit 45.4 41.0 - 52.0 %    MCV 88 80 - 100 fL    MCH 29.3 26.0 - 34.0 pg    MCHC 33.3 32.0 - 36.0 g/dL    RDW 11.8 11.5 - 14.5 %    Platelets 296 150 - 450 x10*3/uL   Magnesium   Result Value Ref Range    Magnesium 2.04 1.60 - 2.40 mg/dL   Troponin, High Sensitivity, 1 Hour   Result Value Ref Range    Troponin I, High Sensitivity 12,589 (HH) 0 - 20 ng/L   Heparin Assay, UFH   Result Value Ref Range    Heparin  Unfractionated 0.2 See Comment Below for Therapeutic Ranges IU/mL   Troponin I, High Sensitivity   Result Value Ref Range    Troponin I, High Sensitivity 12,589 (HH) 0 - 20 ng/L   Troponin I, High Sensitivity   Result Value Ref Range    Troponin I, High Sensitivity 12,686 (HH) 0 - 20 ng/L     XR chest 1 view    Result Date: 11/17/2023  Interpreted By:  Yaw Alba, STUDY: XR CHEST 1 VIEW;  11/17/2023 6:32 pm   INDICATION: Signs/Symptoms:Chest Pain.   COMPARISON: None.   ACCESSION NUMBER(S): [id number]   ORDERING CLINICIAN: JAYLYN ARGUETA   FINDINGS: AP radiograph of the chest was provided.       CARDIOMEDIASTINAL SILHOUETTE: Cardiomediastinal silhouette is normal in size and configuration.   LUNGS: No consolidation, pleural effusion, or pneumothorax is evident.   ABDOMEN: No remarkable upper abdominal findings.   BONES: No acute osseous changes.       1.  No evidence of acute cardiopulmonary process.     MACRO: None   Signed by: Yaw Alba 11/17/2023 6:47 PM Dictation workstation:   [id number]          Assessment/Plan   Principal Problem:    NSTEMI (non-ST elevated myocardial infarction) (CMS/[facility])    # NSTEMI r/o  # Elevated Troponin  - Sx: Substernal non-radiating chest pain  - Troponin: 8304 --> 4042. Repeat ordered  - EKG: T wave inversion in I, V1, V2, V3   - Heart score: 4 point; risk of MACE of 12-16%  - PRAKASH Score: 2 points; 8% all cause mortality  - CXR: No evidence of acute cardiopulmonary process  -  mg once given in ED, On ASA 81 mg PO daily  - Repeat troponin 0049 14,459 @ 0544 [id number] and @ 1052 [id number]  Plan  - On Brillinta 90 mg BID  - Atorvastatin 80 mg   - Losartan 25 mg   - On continuous low intensity Heparin gtt per ACS guidelines  - Pt. Currently on telemetry in Stepdown  - Cardiology following   - Cardiology planning LHC and Echo for Monday       IVF: None  Electrolytes: Mg >2, K >4  Diet: Regular Diet   GI ppx: None  DVT ppx: Heparin gtt  Antibiotics: None  Code: Full  Code      Disposition: Admitted for NSTEMI, echo pending. eLOS >48Hr      Felipe Bob MD

## 2023-11-18 NOTE — CONSULTS
"Consults  History Of Present Illness:    Narciso Coates is a 33 y.o. male with h/o hypertension (no longer treated), obesity admitted with NSTEMI.  Reports having a few events of midsternal chest pain over the past few weeks. He did not seek medical care initially as his symptoms only lasted for a few seconds.  At 2:00am on Thursday morning he woke from sleep with severe midsternal sharp chest pain that was constant until 6:00am and then became dull and resolved.  When he went to work at 4:00pm chest pain reoccurred as sharp pain midsternally with associated SOB.  Troponin 8304, 4042, 85883, 12563.  He was given SL nitroglycerin x2 in ER with no improvement in symptoms. Was given IV morphine which improved chest pain, but continues to have 2/10 chest pain.  Denies recent acute illness, no symptoms of URI, fever, or chills.     Last Recorded Vitals:  Vitals:    11/17/23 2226 11/18/23 0145 11/18/23 0530 11/18/23 0822   BP: 128/83 133/81 125/82 125/85   BP Location: Right arm Right arm Right arm Left arm   Patient Position:  Lying Lying Sitting   Pulse: 84 79 94 97   Resp: 18 17 18 16   Temp: 36 °C (96.8 °F) 36.5 °C (97.7 °F) 36.6 °C (97.9 °F) 36.7 °C (98.1 °F)   TempSrc: Tympanic Temporal Tympanic Temporal   SpO2: 98% 93% 95% 96%   Weight: 113 kg (249 lb 9 oz)      Height: 1.778 m (5' 10\")          Last Labs:  CBC - 11/18/2023:  5:44 AM  13.5 15.1 296    45.4      CMP - 11/18/2023:  5:44 AM  8.8 7.4 90 --- 0.6   3.3 4.3 57 74      PTT - 11/18/2023: 12:49 AM  _   _ 52     Troponin I, High Sensitivity   Date/Time Value Ref Range Status   11/18/2023 05:44 AM 12,589 (HH) 0 - 20 ng/L Final     Comment:     Previous result verified on 11/17/2023 2008 on specimen/case [id number] called with component [facility] for procedure Troponin I, High Sensitivity, Initial with value 8,304 ng/L.   11/18/2023 05:44 AM 12,589 () 0 - 20 ng/L Final     Comment:     Previous result verified on 11/17/2023 2008 on specimen/case " [id number] called with component [facility] for procedure Troponin I, High Sensitivity, Initial with value 8,304 ng/L.   11/18/2023 12:49 AM 14,459 (HH) 0 - 20 ng/L Final     Comment:     Previous result verified on 11/17/2023 2008 on specimen/case [id number] called with component [facility] for procedure Troponin I, High Sensitivity, Initial with value 8,304 ng/L.     BNP   Date/Time Value Ref Range Status   11/17/2023 06:28  (H) 0 - 99 pg/mL Final     Hemoglobin A1C   Date/Time Value Ref Range Status   03/03/2020 09:34 AM 5.4 % Final     Comment:          Diagnosis of Diabetes-Adults   Non-Diabetic: < or = 5.6%   Increased risk for developing diabetes: 5.7-6.4%   Diagnostic of diabetes: > or = 6.5%  .       Monitoring of Diabetes                Age (y)     Therapeutic Goal (%)   Adults:          >18           <7.0   Pediatrics:    13-18           <7.5                   7-12           <8.0                   0- 6            7.5-8.5   American Diabetes Association. Diabetes Care 33(S1), Jan 2010.       LDL Calculated   Date/Time Value Ref Range Status   11/18/2023 12:49  (H) <=99 mg/dL Final     Comment:                                 Near   Borderline      AGE      Desirable  Optimal    High     High     Very High     0-19 Y     0 - 109     ---    110-129   >/= 130     ----    20-24 Y     0 - 119     ---    120-159   >/= 160     ----      >24 Y     0 -  99   100-129  130-159   160-189     >/=190       VLDL   Date/Time Value Ref Range Status   11/18/2023 12:49 AM 28 0 - 40 mg/dL Final   03/03/2020 09:34 AM 31 0 - 40 mg/dL Final      Last I/O:  I/O last 3 completed shifts:  In: 323.7 (2.9 mL/kg) [P.O.:240; I.V.:83.7 (0.7 mL/kg)]  Out: - (0 mL/kg)   Weight: 113.2 kg     Past Cardiology Tests (Last 3 Years):  EKG:  NSR with inferior TWI       Past Medical History:  He has a past medical history of Hypertension.    Past Surgical History:  He has a past surgical history that includes Abdominal surgery.       Social History:  He reports that he has never smoked. He has quit using smokeless tobacco.  His smokeless tobacco use included chew. He reports that he does not currently use alcohol. He reports that he does not use drugs.    Family History:  No family history on file.     Allergies:  Patient has no known allergies.    Inpatient Medications:  Scheduled medications   Medication Dose Route Frequency    aspirin  81 mg oral Daily    atorvastatin  40 mg oral Nightly    heparin  4,000 Units intravenous Once    ketorolac  30 mg intravenous Once    losartan  25 mg oral Daily    metoprolol tartrate  25 mg oral BID    perflutren lipid microspheres  0.5-10 mL of dilution intravenous Once in imaging    perflutren protein A microsphere  0.5 mL intravenous Once in imaging    sulfur hexafluoride microsphr  2 mL intravenous Once in imaging    ticagrelor  90 mg oral BID     PRN medications   Medication    acetaminophen    ondansetron ODT    Or    ondansetron    polyethylene glycol     Continuous Medications   Medication Dose Last Rate    heparin  0-4,000 Units/hr 1,000 Units/hr (11/18/23 0100)     Outpatient Medications:  Current Outpatient Medications   Medication Instructions    acetaminophen (Tylenol) 325 mg tablet 2-3 tablets, oral, Every 6 hours PRN    ibuprofen 200 mg tablet 4 tablets, oral, Every 6 hours PRN       Physical Exam:  Constitutional: Pleasant, Awake/Alert/Oriented to person place and time. No distress  Head: Atraumatic, Normocephalic  Eyes: EOMI. JONATHAN  Neck: Enlarged neck circumference, No JVD  Cardiovascular: Regular rate and rhythm, S1, S2. No extra heart sounds or murmurs  Respiratory: Clear to auscultation bilaterally. No wheezing, rales or rhonchi. Good chest wall expansion  Abdomen: Soft, Nontender, Obese. Bowel sounds appreciated  Musculoskeletal: ROM intact. Muscle strength grossly intact upper and lower extremities 5/5.   Neurological: CNII-XII intact. Sensation grossly intact  Extremities: Warm and  dry. No acute rashes and lesions  Psychiatric: Appropriate mood and affect       Assessment/Plan   Very pleasant 32yo with h/o hypertension (untreated) and obesity admitted with NSTEMI.    Assessment:  NSTEMI vs. Acute myopericarditis   Hypertension  Dyslipidemia    Plan:  - Recommend [facility] for definitive ischemic evaluation-- plan for Monday   - Echocardiogram for assessment of mechanical and structural function  - Continue heparin per ACS protocol  - Continue aspirin 81mg daily and Brilinta 90mg BID  - Recommend HI statin  - Will start metoprolol tartrate 25mg BID  - Consider adding ACEi/ARB or ARNI if EF <40%  - Will give toradol 30mg IV x1     Peripheral IV 11/17/23 20 G Distal;Left;Upper;Anterior Arm (Active)   Site Assessment Clean;Dry;Intact 11/18/23 0800   Dressing Type Transparent 11/18/23 0800   Line Status Infusing 11/18/23 0800   Dressing Status Clean;Dry;Occlusive 11/18/23 0800   Number of days: 1       Code Status:  Full Code        ROMAIN Garcia-CNP

## 2023-11-19 LAB
ALBUMIN SERPL BCP-MCNC: 3.8 G/DL (ref 3.4–5)
ALP SERPL-CCNC: 72 U/L (ref 33–120)
ALT SERPL W P-5'-P-CCNC: 44 U/L (ref 10–52)
AMPHETAMINES UR QL SCN: NORMAL
ANION GAP SERPL CALC-SCNC: 16 MMOL/L (ref 10–20)
AST SERPL W P-5'-P-CCNC: 64 U/L (ref 9–39)
BARBITURATES UR QL SCN: NORMAL
BENZODIAZ UR QL SCN: NORMAL
BILIRUB SERPL-MCNC: 0.9 MG/DL (ref 0–1.2)
BUN SERPL-MCNC: 12 MG/DL (ref 6–23)
BZE UR QL SCN: NORMAL
CALCIUM SERPL-MCNC: 8.8 MG/DL (ref 8.6–10.3)
CANNABINOIDS UR QL SCN: NORMAL
CHLORIDE SERPL-SCNC: 105 MMOL/L (ref 98–107)
CO2 SERPL-SCNC: 23 MMOL/L (ref 21–32)
CREAT SERPL-MCNC: 0.98 MG/DL (ref 0.5–1.3)
ERYTHROCYTE [DISTWIDTH] IN BLOOD BY AUTOMATED COUNT: 11.8 % (ref 11.5–14.5)
ERYTHROCYTE [DISTWIDTH] IN BLOOD BY AUTOMATED COUNT: 11.8 % (ref 11.5–14.5)
FENTANYL+NORFENTANYL UR QL SCN: NORMAL
GFR SERPL CREATININE-BSD FRML MDRD: >90 ML/MIN/1.73M*2
GLUCOSE SERPL-MCNC: 101 MG/DL (ref 74–99)
HCT VFR BLD AUTO: 43.2 % (ref 41–52)
HCT VFR BLD AUTO: 43.8 % (ref 41–52)
HGB BLD-MCNC: 14.6 G/DL (ref 13.5–17.5)
HGB BLD-MCNC: 14.7 G/DL (ref 13.5–17.5)
MAGNESIUM SERPL-MCNC: 2.17 MG/DL (ref 1.6–2.4)
MCH RBC QN AUTO: 28.9 PG (ref 26–34)
MCH RBC QN AUTO: 29 PG (ref 26–34)
MCHC RBC AUTO-ENTMCNC: 33.3 G/DL (ref 32–36)
MCHC RBC AUTO-ENTMCNC: 34 G/DL (ref 32–36)
MCV RBC AUTO: 85 FL (ref 80–100)
MCV RBC AUTO: 87 FL (ref 80–100)
NRBC BLD-RTO: 0 /100 WBCS (ref 0–0)
NRBC BLD-RTO: 0 /100 WBCS (ref 0–0)
OPIATES UR QL SCN: NORMAL
OXYCODONE+OXYMORPHONE UR QL SCN: NORMAL
PCP UR QL SCN: NORMAL
PLATELET # BLD AUTO: 244 X10*3/UL (ref 150–450)
PLATELET # BLD AUTO: 248 X10*3/UL (ref 150–450)
POTASSIUM SERPL-SCNC: 3.6 MMOL/L (ref 3.5–5.3)
PROT SERPL-MCNC: 6.5 G/DL (ref 6.4–8.2)
RBC # BLD AUTO: 5.04 X10*6/UL (ref 4.5–5.9)
RBC # BLD AUTO: 5.09 X10*6/UL (ref 4.5–5.9)
SODIUM SERPL-SCNC: 140 MMOL/L (ref 136–145)
UFH PPP CHRO-ACNC: 0.2 IU/ML
UFH PPP CHRO-ACNC: 0.4 IU/ML
UFH PPP CHRO-ACNC: 0.4 IU/ML
WBC # BLD AUTO: 10.7 X10*3/UL (ref 4.4–11.3)
WBC # BLD AUTO: 10.8 X10*3/UL (ref 4.4–11.3)

## 2023-11-19 PROCEDURE — 2500000001 HC RX 250 WO HCPCS SELF ADMINISTERED DRUGS (ALT 637 FOR MEDICARE OP): Performed by: NURSE PRACTITIONER

## 2023-11-19 PROCEDURE — 85027 COMPLETE CBC AUTOMATED: CPT

## 2023-11-19 PROCEDURE — 83735 ASSAY OF MAGNESIUM: CPT

## 2023-11-19 PROCEDURE — 80053 COMPREHEN METABOLIC PANEL: CPT

## 2023-11-19 PROCEDURE — 36415 COLL VENOUS BLD VENIPUNCTURE: CPT

## 2023-11-19 PROCEDURE — 2500000001 HC RX 250 WO HCPCS SELF ADMINISTERED DRUGS (ALT 637 FOR MEDICARE OP)

## 2023-11-19 PROCEDURE — 99232 SBSQ HOSP IP/OBS MODERATE 35: CPT

## 2023-11-19 PROCEDURE — 85520 HEPARIN ASSAY: CPT

## 2023-11-19 PROCEDURE — 80307 DRUG TEST PRSMV CHEM ANLYZR: CPT

## 2023-11-19 PROCEDURE — 2500000004 HC RX 250 GENERAL PHARMACY W/ HCPCS (ALT 636 FOR OP/ED)

## 2023-11-19 PROCEDURE — 2060000001 HC INTERMEDIATE ICU ROOM DAILY

## 2023-11-19 PROCEDURE — 99232 SBSQ HOSP IP/OBS MODERATE 35: CPT | Performed by: NURSE PRACTITIONER

## 2023-11-19 RX ORDER — ONDANSETRON 4 MG/1
4 TABLET, FILM COATED ORAL EVERY 8 HOURS PRN
Status: DISCONTINUED | OUTPATIENT
Start: 2023-11-19 | End: 2023-11-21 | Stop reason: HOSPADM

## 2023-11-19 RX ORDER — PANTOPRAZOLE SODIUM 40 MG/1
40 TABLET, DELAYED RELEASE ORAL
Status: DISCONTINUED | OUTPATIENT
Start: 2023-11-20 | End: 2023-11-21 | Stop reason: HOSPADM

## 2023-11-19 RX ADMIN — LOSARTAN POTASSIUM 25 MG: 50 TABLET, FILM COATED ORAL at 09:08

## 2023-11-19 RX ADMIN — ATORVASTATIN CALCIUM 80 MG: 80 TABLET, FILM COATED ORAL at 20:43

## 2023-11-19 RX ADMIN — TICAGRELOR 90 MG: 90 TABLET ORAL at 20:43

## 2023-11-19 RX ADMIN — METOPROLOL TARTRATE 25 MG: 25 TABLET, FILM COATED ORAL at 09:08

## 2023-11-19 RX ADMIN — ONDANSETRON 4 MG: 2 INJECTION INTRAMUSCULAR; INTRAVENOUS at 03:19

## 2023-11-19 RX ADMIN — ASPIRIN 81 MG CHEWABLE TABLET 81 MG: 81 TABLET CHEWABLE at 09:08

## 2023-11-19 RX ADMIN — HEPARIN SODIUM 1400 UNITS/HR: 10000 INJECTION, SOLUTION INTRAVENOUS at 16:08

## 2023-11-19 RX ADMIN — METOPROLOL TARTRATE 25 MG: 25 TABLET, FILM COATED ORAL at 20:43

## 2023-11-19 RX ADMIN — TICAGRELOR 90 MG: 90 TABLET ORAL at 09:08

## 2023-11-19 ASSESSMENT — PAIN SCALES - GENERAL
PAINLEVEL_OUTOF10: 0 - NO PAIN

## 2023-11-19 ASSESSMENT — COGNITIVE AND FUNCTIONAL STATUS - GENERAL
MOBILITY SCORE: 24
DAILY ACTIVITIY SCORE: 24
MOBILITY SCORE: 24
DAILY ACTIVITIY SCORE: 24

## 2023-11-19 ASSESSMENT — PAIN - FUNCTIONAL ASSESSMENT: PAIN_FUNCTIONAL_ASSESSMENT: 0-10

## 2023-11-19 NOTE — PROGRESS NOTES
Narciso Coates is a 33 y.o. male on day 2 of admission presenting with NSTEMI (non-ST elevated myocardial infarction) (CMS/HCC).      Subjective   He denies CP, palpitations, SOB today. He states he is getting  in 2 weeks which has caused some stress. We discussed the plan to complete LHC and echo tomorrow.        Objective     Last Recorded Vitals  /75   Pulse 98   Temp 36.3 °C (97.3 °F)   Resp 16   Wt 112 kg (245 lb 14.4 oz)   SpO2 96% Comment: room air  Intake/Output last 3 Shifts:    Intake/Output Summary (Last 24 hours) at 11/19/2023 1644  Last data filed at 11/19/2023 1608  Gross per 24 hour   Intake 530.02 ml   Output 1 ml   Net 529.02 ml       Admission Weight  Weight: 118 kg (260 lb) (11/17/23 1825)    Daily Weight  11/19/23 : 112 kg (245 lb 14.4 oz)    Image Results  XR chest 1 view  Narrative: Interpreted By:  Yaw Alba,   STUDY:  XR CHEST 1 VIEW;  11/17/2023 6:32 pm      INDICATION:  Signs/Symptoms:Chest Pain.      COMPARISON:  None.      ACCESSION NUMBER(S):  [id number]      ORDERING CLINICIAN:  JAYLYN ARGUETA      FINDINGS:  AP radiograph of the chest was provided.              CARDIOMEDIASTINAL SILHOUETTE:  Cardiomediastinal silhouette is normal in size and configuration.      LUNGS:  No consolidation, pleural effusion, or pneumothorax is evident.      ABDOMEN:  No remarkable upper abdominal findings.      BONES:  No acute osseous changes.      Impression: 1.  No evidence of acute cardiopulmonary process.          MACRO:  None      Signed by: Yaw Alba 11/17/2023 6:47 PM  Dictation workstation:   [id number]      Physical Exam  Constitutional:       General: He is not in acute distress.     Appearance: Normal appearance. He is not ill-appearing, toxic-appearing or diaphoretic.   HENT:      Head: Normocephalic and atraumatic.      Nose: Nose normal. No congestion or rhinorrhea.   Eyes:      General: No scleral icterus.        Right eye: No discharge.          Left eye: No discharge.      Extraocular Movements: Extraocular movements intact.      Conjunctiva/sclera: Conjunctivae normal.      Pupils: Pupils are equal, round, and reactive to light.   Cardiovascular:      Rate and Rhythm: Normal rate and regular rhythm.      Heart sounds: Normal heart sounds. No murmur heard.     No friction rub. No gallop.   Pulmonary:      Effort: Pulmonary effort is normal. No respiratory distress.      Breath sounds: Normal breath sounds. No stridor. No wheezing, rhonchi or rales.   Chest:      Chest wall: No tenderness.   Abdominal:      Palpations: Abdomen is soft.      Tenderness: There is no abdominal tenderness. There is no guarding or rebound.   Musculoskeletal:         General: No swelling, deformity or signs of injury. Normal range of motion.      Cervical back: Normal range of motion.   Skin:     General: Skin is warm and dry.      Coloration: Skin is not jaundiced.      Findings: No bruising or erythema.   Neurological:      General: No focal deficit present.      Mental Status: He is alert and oriented to person, place, and time. Mental status is at baseline.      Sensory: No sensory deficit.      Motor: No weakness.   Psychiatric:         Mood and Affect: Mood normal.         Behavior: Behavior normal.         Thought Content: Thought content normal.         Judgment: Judgment normal.         Relevant Results  Scheduled medications  aspirin, 81 mg, oral, Daily  atorvastatin, 80 mg, oral, Nightly  heparin, 4,000 Units, intravenous, Once  losartan, 25 mg, oral, Daily  metoprolol tartrate, 25 mg, oral, BID  [START ON 11/20/2023] pantoprazole, 40 mg, oral, Daily before breakfast  perflutren lipid microspheres, 0.5-10 mL of dilution, intravenous, Once in imaging  perflutren protein A microsphere, 0.5 mL, intravenous, Once in imaging  sulfur hexafluoride microsphr, 2 mL, intravenous, Once in imaging  ticagrelor, 90 mg, oral, BID      Continuous medications  heparin, 0-4,000  Units/hr, Last Rate: 1,400 Units/hr (11/19/23 1608)      PRN medications  PRN medications: acetaminophen, ondansetron ODT **OR** ondansetron, ondansetron, polyethylene glycol     Results for orders placed or performed during the hospital encounter of 11/17/23 (from the past 24 hour(s))   Heparin Assay, UFH   Result Value Ref Range    Heparin Unfractionated 0.2 See Comment Below for Therapeutic Ranges IU/mL   Magnesium   Result Value Ref Range    Magnesium 2.17 1.60 - 2.40 mg/dL   CBC   Result Value Ref Range    WBC 10.8 4.4 - 11.3 x10*3/uL    nRBC 0.0 0.0 - 0.0 /100 WBCs    RBC 5.09 4.50 - 5.90 x10*6/uL    Hemoglobin 14.7 13.5 - 17.5 g/dL    Hematocrit 43.2 41.0 - 52.0 %    MCV 85 80 - 100 fL    MCH 28.9 26.0 - 34.0 pg    MCHC 34.0 32.0 - 36.0 g/dL    RDW 11.8 11.5 - 14.5 %    Platelets 248 150 - 450 x10*3/uL   Comprehensive metabolic panel   Result Value Ref Range    Glucose 101 (H) 74 - 99 mg/dL    Sodium 140 136 - 145 mmol/L    Potassium 3.6 3.5 - 5.3 mmol/L    Chloride 105 98 - 107 mmol/L    Bicarbonate 23 21 - 32 mmol/L    Anion Gap 16 10 - 20 mmol/L    Urea Nitrogen 12 6 - 23 mg/dL    Creatinine 0.98 0.50 - 1.30 mg/dL    eGFR >90 >60 mL/min/1.73m*2    Calcium 8.8 8.6 - 10.3 mg/dL    Albumin 3.8 3.4 - 5.0 g/dL    Alkaline Phosphatase 72 33 - 120 U/L    Total Protein 6.5 6.4 - 8.2 g/dL    AST 64 (H) 9 - 39 U/L    Bilirubin, Total 0.9 0.0 - 1.2 mg/dL    ALT 44 10 - 52 U/L   Heparin Assay, UFH   Result Value Ref Range    Heparin Unfractionated 0.2 See Comment Below for Therapeutic Ranges IU/mL   CBC   Result Value Ref Range    WBC 10.7 4.4 - 11.3 x10*3/uL    nRBC 0.0 0.0 - 0.0 /100 WBCs    RBC 5.04 4.50 - 5.90 x10*6/uL    Hemoglobin 14.6 13.5 - 17.5 g/dL    Hematocrit 43.8 41.0 - 52.0 %    MCV 87 80 - 100 fL    MCH 29.0 26.0 - 34.0 pg    MCHC 33.3 32.0 - 36.0 g/dL    RDW 11.8 11.5 - 14.5 %    Platelets 244 150 - 450 x10*3/uL   Heparin Assay, UFH   Result Value Ref Range    Heparin Unfractionated 0.4 See Comment  Below for Therapeutic Ranges IU/mL   Heparin Assay, UFH   Result Value Ref Range    Heparin Unfractionated 0.4 See Comment Below for Therapeutic Ranges IU/mL   Drug Screen, Urine   Result Value Ref Range    Amphetamine Screen, Urine Presumptive Negative Presumptive Negative    Barbiturate Screen, Urine Presumptive Negative Presumptive Negative    Benzodiazepines Screen, Urine Presumptive Negative Presumptive Negative    Cannabinoid Screen, Urine Presumptive Negative Presumptive Negative    Cocaine Metabolite Screen, Urine Presumptive Negative Presumptive Negative    Fentanyl Screen, Urine Presumptive Negative Presumptive Negative    Opiate Screen, Urine Presumptive Negative Presumptive Negative    Oxycodone Screen, Urine Presumptive Negative Presumptive Negative    PCP Screen, Urine Presumptive Negative Presumptive Negative      Assessment/Plan      Principal Problem:    NSTEMI (non-ST elevated myocardial infarction) (CMS/[facility])     # NSTEMI r/o  # Elevated Troponin  - Sx: Substernal non-radiating chest pain  - Troponin: 8304 --> 4042. Repeat ordered  - EKG: T wave inversion in I, V1, V2, V3   - Heart score: 4 point; risk of MACE of 12-16%  - PRAKASH Score: 2 points; 8% all cause mortality  - CXR: No evidence of acute cardiopulmonary process  -  mg once given in ED, On ASA 81 mg PO daily  - Repeat troponin 0049 14,459 @ 0544 [id number] and @ 1052 [id number]  Plan  - On Brillinta 90 mg BID  - Atorvastatin 80 mg   - metoprolol tartrate 25 mg BID  - On continuous low intensity Heparin gtt per ACS guidelines  - Pt. Currently on telemetry in Stepdown  - Cardiology following   - Cardiology planning LHC and Echo for Monday         IVF: None  Electrolytes: Mg >2, K >4  Diet: Regular Diet   GI ppx: None  DVT ppx: Heparin gtt  Antibiotics: None  Code: Full Code      Disposition: Admitted for NSTEMI, echo and LHC Monday.  eLOS >48Hr      Simba Musaitif, DO

## 2023-11-19 NOTE — PROGRESS NOTES
Subjective Data:  Chest pain resolved after toradol          Objective Data:  Last Recorded Vitals:  Vitals:    11/18/23 2022 11/19/23 0624 11/19/23 0800 11/19/23 0908   BP: 110/69 103/71  120/76   BP Location: Left arm Right arm  Right arm   Patient Position: Lying   Sitting   Pulse: 84 90  102   Resp: 18 18  16   Temp: 37.3 °C (99.1 °F) 36.5 °C (97.7 °F)  36.1 °C (97 °F)   TempSrc: Temporal Temporal  Temporal   SpO2: 94% 94% 95% 95%   Weight:  112 kg (245 lb 14.4 oz)     Height:           Last Labs:  CBC - 11/19/2023:  5:40 AM  10.7 14.6 244    43.8      CMP - 11/19/2023:  3:16 AM  8.8 6.5 64 --- 0.9   3.3 3.8 44 72      PTT - 11/18/2023: 12:49 AM  _   _ 52     TROPHS   Date/Time Value Ref Range Status   11/18/2023 10:52 AM 12,686 0 - 20 ng/L Final     Comment:     Previous result verified on 11/17/2023 2008 on specimen/case [id number] called with component [facility] for procedure Troponin I, High Sensitivity, Initial with value 8,304 ng/L.   11/18/2023 05:44 AM 12,589 0 - 20 ng/L Final     Comment:     Previous result verified on 11/17/2023 2008 on specimen/case [id number] called with component [facility] for procedure Troponin I, High Sensitivity, Initial with value 8,304 ng/L.   11/18/2023 05:44 AM 12,589 0 - 20 ng/L Final     Comment:     Previous result verified on 11/17/2023 2008 on specimen/case [id number] called with component [facility] for procedure Troponin I, High Sensitivity, Initial with value 8,304 ng/L.     BNP   Date/Time Value Ref Range Status   11/17/2023 06:28  0 - 99 pg/mL Final     HGBA1C   Date/Time Value Ref Range Status   11/18/2023 05:44 AM 5.3 see below % Final   03/03/2020 09:34 AM 5.4 % Final     Comment:          Diagnosis of Diabetes-Adults   Non-Diabetic: < or = 5.6%   Increased risk for developing diabetes: 5.7-6.4%   Diagnostic of diabetes: > or = 6.5%  .       Monitoring of Diabetes                Age (y)     Therapeutic Goal (%)   Adults:          >18           <7.0    Pediatrics:    13-18           <7.5                   7-12           <8.0                   0- 6            7.5-8.5   American Diabetes Association. Diabetes Care 33(S1), Jan 2010.       LDLCALC   Date/Time Value Ref Range Status   11/18/2023 12:49  <=99 mg/dL Final     Comment:                                 Near   Borderline      AGE      Desirable  Optimal    High     High     Very High     0-19 Y     0 - 109     ---    110-129   >/= 130     ----    20-24 Y     0 - 119     ---    120-159   >/= 160     ----      >24 Y     0 -  99   100-129  130-159   160-189     >/=190       VLDL   Date/Time Value Ref Range Status   11/18/2023 12:49 AM 28 0 - 40 mg/dL Final   03/03/2020 09:34 AM 31 0 - 40 mg/dL Final      Last I/O:  I/O last 3 completed shifts:  In: 588.3 (5.3 mL/kg) [P.O.:240; I.V.:348.3 (3.1 mL/kg)]  Out: 1 (0 mL/kg) [Urine:1 (0 mL/kg/hr)]  Weight: 111.5 kg     Past Cardiology Tests (Last 3 Years):  EKG:  NSR with inferior TWI      Inpatient Medications:  Scheduled medications   Medication Dose Route Frequency    aspirin  81 mg oral Daily    atorvastatin  80 mg oral Nightly    heparin  4,000 Units intravenous Once    losartan  25 mg oral Daily    metoprolol tartrate  25 mg oral BID    perflutren lipid microspheres  0.5-10 mL of dilution intravenous Once in imaging    perflutren protein A microsphere  0.5 mL intravenous Once in imaging    sulfur hexafluoride microsphr  2 mL intravenous Once in imaging    ticagrelor  90 mg oral BID     PRN medications   Medication    acetaminophen    ondansetron ODT    Or    ondansetron    polyethylene glycol     Continuous Medications   Medication Dose Last Rate    heparin  0-4,000 Units/hr 1,400 Units/hr (11/19/23 0351)       Physical Exam:  Constitutional: Pleasant, Awake/Alert/Oriented to person place and time. No distress  Head: Atraumatic, Normocephalic  Eyes: EOMI. JONATHAN  Neck: Enlarged neck circumference, No JVD  Cardiovascular: Regular rate and rhythm, S1, S2. No  extra heart sounds or murmurs  Respiratory: Clear to auscultation bilaterally. No wheezing, rales or rhonchi. Good chest wall expansion  Abdomen: Soft, Nontender, Obese. Bowel sounds appreciated  Musculoskeletal: ROM intact. Muscle strength grossly intact upper and lower extremities 5/5.   Neurological: CNII-XII intact. Sensation grossly intact  Extremities: Warm and dry. No acute rashes and lesions  Psychiatric: Appropriate mood and affect       Assessment/Plan   Very pleasant 32yo with h/o hypertension (untreated) and obesity admitted with NSTEMI.     Assessment:  NSTEMI vs. Acute myopericarditis   Hypertension  Dyslipidemia     Plan:  - NPO after midnight for [facility] Monday   - Echocardiogram for assessment of mechanical and structural function  - Continue heparin per ACS protocol  - Continue aspirin 81mg daily and Brilinta 90mg BID  - Recommend HI statin  - Will start metoprolol tartrate 25mg BID  - Consider adding ACEi/ARB or ARNI if EF <40%  - Recommend NSAID therapy for recurrent chest pain     Peripheral IV 11/17/23 20 G Distal;Left;Upper;Anterior Arm (Active)   Site Assessment Clean;Dry;Intact 11/18/23 2342   Dressing Status Clean;Dry 11/18/23 2342   Number of days: 2       Code Status:  Full Code      ROMAIN Garcia-CNP

## 2023-11-19 NOTE — CARE PLAN
The patient's goals for the shift include Pt will perform ADLs without pain.    The clinical goals for the shift include Patient will remain pain free through end of shift.    Problem: Pain - Adult  Goal: Verbalizes/displays adequate comfort level or baseline comfort level  Outcome: Progressing     Problem: Safety - Adult  Goal: Free from fall injury  Outcome: Progressing     Problem: Discharge Planning  Goal: Discharge to home or other facility with appropriate resources  Outcome: Progressing     Problem: Chronic Conditions and Co-morbidities  Goal: Patient's chronic conditions and co-morbidity symptoms are monitored and maintained or improved  Outcome: Progressing     Problem: Pain  Goal: Takes deep breaths with improved pain control throughout the shift  Outcome: Progressing  Goal: Turns in bed with improved pain control throughout the shift  Outcome: Progressing  Goal: Walks with improved pain control throughout the shift  Outcome: Progressing  Goal: Performs ADL's with improved pain control throughout shift  Outcome: Progressing  Goal: Free from opioid side effects throughout the shift  Outcome: Progressing  Goal: Free from acute confusion related to pain meds throughout the shift  Outcome: Progressing

## 2023-11-20 ENCOUNTER — APPOINTMENT (OUTPATIENT)
Dept: CARDIOLOGY | Facility: HOSPITAL | Age: 33
DRG: 322 | End: 2023-11-20
Payer: COMMERCIAL

## 2023-11-20 LAB
ALBUMIN SERPL BCP-MCNC: 3.9 G/DL (ref 3.4–5)
ANION GAP SERPL CALC-SCNC: 12 MMOL/L (ref 10–20)
AORTIC VALVE MEAN GRADIENT: 4.3
AORTIC VALVE PEAK VELOCITY: 1.36
AV PEAK GRADIENT: 7.4
AVA (PEAK VEL): 1.72
AVA (VTI): 1.8
BUN SERPL-MCNC: 16 MG/DL (ref 6–23)
CALCIUM SERPL-MCNC: 8.9 MG/DL (ref 8.6–10.3)
CHLORIDE SERPL-SCNC: 104 MMOL/L (ref 98–107)
CO2 SERPL-SCNC: 28 MMOL/L (ref 21–32)
CREAT SERPL-MCNC: 1.3 MG/DL (ref 0.5–1.3)
CRP SERPL-MCNC: 3.96 MG/DL
DRVVT SCREEN TO CONFIRM RATIO: 1.39 RATIO
DRVVT/DRVVT CFM NRMLZD PPP-RTO: 1.06 RATIO
DRVVT/DRVVT CFM P DOAC NEUT NORM PPP-RTO: 1.3 RATIO
EJECTION FRACTION APICAL 4 CHAMBER: 46.9
EJECTION FRACTION: 45
ERYTHROCYTE [DISTWIDTH] IN BLOOD BY AUTOMATED COUNT: 12.1 % (ref 11.5–14.5)
ERYTHROCYTE [SEDIMENTATION RATE] IN BLOOD BY WESTERGREN METHOD: 21 MM/H (ref 0–15)
GFR SERPL CREATININE-BSD FRML MDRD: 74 ML/MIN/1.73M*2
GLUCOSE SERPL-MCNC: 91 MG/DL (ref 74–99)
HCT VFR BLD AUTO: 43.8 % (ref 41–52)
HCYS SERPL-SCNC: 10.38 UMOL/L (ref 5–13.9)
HGB BLD-MCNC: 14.7 G/DL (ref 13.5–17.5)
LA 2 SCREEN W REFLEX-IMP: ABNORMAL
LEFT ATRIUM VOLUME AREA LENGTH INDEX BSA: 15.6
LEFT VENTRICLE INTERNAL DIMENSION DIASTOLE: 4.69 (ref 3.5–6)
LEFT VENTRICULAR OUTFLOW TRACT DIAMETER: 2.01
MAGNESIUM SERPL-MCNC: 1.93 MG/DL (ref 1.6–2.4)
MCH RBC QN AUTO: 29.5 PG (ref 26–34)
MCHC RBC AUTO-ENTMCNC: 33.6 G/DL (ref 32–36)
MCV RBC AUTO: 88 FL (ref 80–100)
MITRAL VALVE E/A RATIO: 0.83
MITRAL VALVE E/E' RATIO: 6.25
NORMALIZED SCT PPP-RTO: 0.82 RATIO
NRBC BLD-RTO: 0 /100 WBCS (ref 0–0)
PHOSPHATE SERPL-MCNC: 3.1 MG/DL (ref 2.5–4.9)
PLATELET # BLD AUTO: 273 X10*3/UL (ref 150–450)
POTASSIUM SERPL-SCNC: 4 MMOL/L (ref 3.5–5.3)
RBC # BLD AUTO: 4.98 X10*6/UL (ref 4.5–5.9)
RIGHT VENTRICLE FREE WALL PEAK S': 17
RIGHT VENTRICLE PEAK SYSTOLIC PRESSURE: 13.5
SILICA CLOTTING TIME CONFIRMATION: 1.03 RATIO
SILICA CLOTTING TIME SCREEN: 0.84 RATIO
SODIUM SERPL-SCNC: 140 MMOL/L (ref 136–145)
TRICUSPID ANNULAR PLANE SYSTOLIC EXCURSION: 2.4
UFH PPP CHRO-ACNC: 0.3 IU/ML
WBC # BLD AUTO: 11.1 X10*3/UL (ref 4.4–11.3)

## 2023-11-20 PROCEDURE — 92920 PRQ TRLUML C ANGIOP 1ART&/BR: CPT | Performed by: INTERNAL MEDICINE

## 2023-11-20 PROCEDURE — 2780000003 HC OR 278 NO HCPCS: Performed by: INTERNAL MEDICINE

## 2023-11-20 PROCEDURE — C1887 CATHETER, GUIDING: HCPCS | Performed by: INTERNAL MEDICINE

## 2023-11-20 PROCEDURE — 99233 SBSQ HOSP IP/OBS HIGH 50: CPT

## 2023-11-20 PROCEDURE — 80069 RENAL FUNCTION PANEL: CPT

## 2023-11-20 PROCEDURE — 2550000001 HC RX 255 CONTRASTS: Performed by: INTERNAL MEDICINE

## 2023-11-20 PROCEDURE — 85027 COMPLETE CBC AUTOMATED: CPT

## 2023-11-20 PROCEDURE — 2500000004 HC RX 250 GENERAL PHARMACY W/ HCPCS (ALT 636 FOR OP/ED)

## 2023-11-20 PROCEDURE — 99153 MOD SED SAME PHYS/QHP EA: CPT | Performed by: INTERNAL MEDICINE

## 2023-11-20 PROCEDURE — 2720000007 HC OR 272 NO HCPCS: Performed by: INTERNAL MEDICINE

## 2023-11-20 PROCEDURE — C1874 STENT, COATED/COV W/DEL SYS: HCPCS | Performed by: INTERNAL MEDICINE

## 2023-11-20 PROCEDURE — 2500000005 HC RX 250 GENERAL PHARMACY W/O HCPCS: Performed by: INTERNAL MEDICINE

## 2023-11-20 PROCEDURE — 86140 C-REACTIVE PROTEIN: CPT | Performed by: PHYSICIAN ASSISTANT

## 2023-11-20 PROCEDURE — C1894 INTRO/SHEATH, NON-LASER: HCPCS | Performed by: INTERNAL MEDICINE

## 2023-11-20 PROCEDURE — 2500000001 HC RX 250 WO HCPCS SELF ADMINISTERED DRUGS (ALT 637 FOR MEDICARE OP): Performed by: NURSE PRACTITIONER

## 2023-11-20 PROCEDURE — 2500000001 HC RX 250 WO HCPCS SELF ADMINISTERED DRUGS (ALT 637 FOR MEDICARE OP)

## 2023-11-20 PROCEDURE — 85652 RBC SED RATE AUTOMATED: CPT | Performed by: PHYSICIAN ASSISTANT

## 2023-11-20 PROCEDURE — 2060000001 HC INTERMEDIATE ICU ROOM DAILY

## 2023-11-20 PROCEDURE — C9600 PERC DRUG-EL COR STENT SING: HCPCS | Performed by: INTERNAL MEDICINE

## 2023-11-20 PROCEDURE — 36415 COLL VENOUS BLD VENIPUNCTURE: CPT

## 2023-11-20 PROCEDURE — 99152 MOD SED SAME PHYS/QHP 5/>YRS: CPT | Performed by: INTERNAL MEDICINE

## 2023-11-20 PROCEDURE — 93306 TTE W/DOPPLER COMPLETE: CPT | Performed by: STUDENT IN AN ORGANIZED HEALTH CARE EDUCATION/TRAINING PROGRAM

## 2023-11-20 PROCEDURE — 92928 PRQ TCAT PLMT NTRAC ST 1 LES: CPT | Performed by: INTERNAL MEDICINE

## 2023-11-20 PROCEDURE — 7100000010 HC PHASE TWO TIME - EACH INCREMENTAL 1 MINUTE: Performed by: INTERNAL MEDICINE

## 2023-11-20 PROCEDURE — C1769 GUIDE WIRE: HCPCS | Performed by: INTERNAL MEDICINE

## 2023-11-20 PROCEDURE — 85347 COAGULATION TIME ACTIVATED: CPT | Performed by: INTERNAL MEDICINE

## 2023-11-20 PROCEDURE — 93458 L HRT ARTERY/VENTRICLE ANGIO: CPT | Performed by: INTERNAL MEDICINE

## 2023-11-20 PROCEDURE — C1725 CATH, TRANSLUMIN NON-LASER: HCPCS | Performed by: INTERNAL MEDICINE

## 2023-11-20 PROCEDURE — 2500000004 HC RX 250 GENERAL PHARMACY W/ HCPCS (ALT 636 FOR OP/ED): Performed by: INTERNAL MEDICINE

## 2023-11-20 PROCEDURE — 85520 HEPARIN ASSAY: CPT

## 2023-11-20 PROCEDURE — 93010 ELECTROCARDIOGRAM REPORT: CPT | Performed by: INTERNAL MEDICINE

## 2023-11-20 PROCEDURE — 7100000009 HC PHASE TWO TIME - INITIAL BASE CHARGE: Performed by: INTERNAL MEDICINE

## 2023-11-20 PROCEDURE — 83735 ASSAY OF MAGNESIUM: CPT

## 2023-11-20 PROCEDURE — 93306 TTE W/DOPPLER COMPLETE: CPT

## 2023-11-20 DEVICE — EVEROLIMUS-ELUTING PLATINUM CHROMIUM CORONARY STENT SYSTEM
Type: IMPLANTABLE DEVICE | Site: CORONARY | Status: FUNCTIONAL
Brand: SYNERGY™ XD

## 2023-11-20 RX ORDER — SODIUM CHLORIDE 9 MG/ML
3 INJECTION, SOLUTION INTRAVENOUS CONTINUOUS
Status: ACTIVE | OUTPATIENT
Start: 2023-11-20 | End: 2023-11-20

## 2023-11-20 RX ORDER — LIDOCAINE HYDROCHLORIDE 20 MG/ML
INJECTION, SOLUTION INFILTRATION; PERINEURAL AS NEEDED
Status: DISCONTINUED | OUTPATIENT
Start: 2023-11-20 | End: 2023-11-20 | Stop reason: HOSPADM

## 2023-11-20 RX ORDER — HEPARIN SODIUM 1000 [USP'U]/ML
INJECTION, SOLUTION INTRAVENOUS; SUBCUTANEOUS AS NEEDED
Status: DISCONTINUED | OUTPATIENT
Start: 2023-11-20 | End: 2023-11-20 | Stop reason: HOSPADM

## 2023-11-20 RX ORDER — MIDAZOLAM HYDROCHLORIDE 1 MG/ML
INJECTION INTRAMUSCULAR; INTRAVENOUS AS NEEDED
Status: DISCONTINUED | OUTPATIENT
Start: 2023-11-20 | End: 2023-11-20 | Stop reason: HOSPADM

## 2023-11-20 RX ORDER — SODIUM CHLORIDE 9 MG/ML
INJECTION, SOLUTION INTRAVENOUS CONTINUOUS PRN
Status: DISCONTINUED | OUTPATIENT
Start: 2023-11-20 | End: 2023-11-20 | Stop reason: HOSPADM

## 2023-11-20 RX ORDER — FENTANYL CITRATE 50 UG/ML
INJECTION, SOLUTION INTRAMUSCULAR; INTRAVENOUS AS NEEDED
Status: DISCONTINUED | OUTPATIENT
Start: 2023-11-20 | End: 2023-11-20 | Stop reason: HOSPADM

## 2023-11-20 RX ORDER — NITROGLYCERIN 5 MG/ML
INJECTION, SOLUTION INTRAVENOUS AS NEEDED
Status: DISCONTINUED | OUTPATIENT
Start: 2023-11-20 | End: 2023-11-20 | Stop reason: HOSPADM

## 2023-11-20 RX ORDER — VERAPAMIL HYDROCHLORIDE 2.5 MG/ML
INJECTION, SOLUTION INTRAVENOUS AS NEEDED
Status: DISCONTINUED | OUTPATIENT
Start: 2023-11-20 | End: 2023-11-20 | Stop reason: HOSPADM

## 2023-11-20 RX ORDER — ACETAMINOPHEN 325 MG/1
650 TABLET ORAL EVERY 6 HOURS PRN
Status: DISCONTINUED | OUTPATIENT
Start: 2023-11-20 | End: 2023-11-21 | Stop reason: HOSPADM

## 2023-11-20 RX ADMIN — LOSARTAN POTASSIUM 25 MG: 50 TABLET, FILM COATED ORAL at 08:18

## 2023-11-20 RX ADMIN — ONDANSETRON 4 MG: 2 INJECTION INTRAMUSCULAR; INTRAVENOUS at 12:02

## 2023-11-20 RX ADMIN — ACETAMINOPHEN 650 MG: 325 TABLET ORAL at 16:46

## 2023-11-20 RX ADMIN — TICAGRELOR 90 MG: 90 TABLET ORAL at 21:08

## 2023-11-20 RX ADMIN — ATORVASTATIN CALCIUM 80 MG: 80 TABLET, FILM COATED ORAL at 21:08

## 2023-11-20 RX ADMIN — METOPROLOL TARTRATE 25 MG: 25 TABLET, FILM COATED ORAL at 08:18

## 2023-11-20 RX ADMIN — PANTOPRAZOLE SODIUM 40 MG: 40 TABLET, DELAYED RELEASE ORAL at 08:18

## 2023-11-20 RX ADMIN — ACETAMINOPHEN 650 MG: 325 TABLET ORAL at 11:55

## 2023-11-20 RX ADMIN — SODIUM CHLORIDE 3 ML/KG/HR: 9 INJECTION, SOLUTION INTRAVENOUS at 12:51

## 2023-11-20 RX ADMIN — ASPIRIN 81 MG CHEWABLE TABLET 81 MG: 81 TABLET CHEWABLE at 08:18

## 2023-11-20 RX ADMIN — ONDANSETRON 4 MG: 2 INJECTION INTRAMUSCULAR; INTRAVENOUS at 16:46

## 2023-11-20 RX ADMIN — PERFLUTREN 1 ML OF DILUTION: 6.52 INJECTION, SUSPENSION INTRAVENOUS at 08:53

## 2023-11-20 RX ADMIN — TICAGRELOR 90 MG: 90 TABLET ORAL at 08:18

## 2023-11-20 RX ADMIN — METOPROLOL TARTRATE 25 MG: 25 TABLET, FILM COATED ORAL at 21:08

## 2023-11-20 ASSESSMENT — COGNITIVE AND FUNCTIONAL STATUS - GENERAL
DAILY ACTIVITIY SCORE: 24
MOBILITY SCORE: 24

## 2023-11-20 ASSESSMENT — PAIN - FUNCTIONAL ASSESSMENT
PAIN_FUNCTIONAL_ASSESSMENT: 0-10
PAIN_FUNCTIONAL_ASSESSMENT: 0-10

## 2023-11-20 ASSESSMENT — PAIN SCALES - GENERAL
PAINLEVEL_OUTOF10: 0 - NO PAIN
PAINLEVEL_OUTOF10: 3

## 2023-11-20 NOTE — CARE PLAN
The patient's goals for the shift include Pt will perform ADLs pain free during shift.    The clinical goals for the shift include Pt will have no complaints of pain or nausea through end of shift

## 2023-11-20 NOTE — PROGRESS NOTES
Subjective Data:  Patient resting in bed post [facility], doing well. Denies any chest pain, chest pressure, palpitations, dizziness, cough, shortness of breath, orthopnea, edema.  Does reports some nausea, he states related to NPO/medications.     Overnight Events:    No acute events reported.      Objective Data:  Last Recorded Vitals:  Vitals:    11/20/23 0547 11/20/23 0800 11/20/23 0941 11/20/23 0941   BP: 117/79  126/85    BP Location: Left arm      Patient Position: Lying      Pulse: 101  84    Resp: 16  17    Temp: 37.6 °C (99.6 °F)      TempSrc: Temporal      SpO2: 94% 95% 99% 99%   Weight: 111 kg (244 lb 4.3 oz)      Height:           Last Labs:  CBC - 11/20/2023:  5:27 AM  11.1 14.7 273    43.8      CMP - 11/20/2023:  5:28 AM  8.9 6.5 64 --- 0.9   3.1 3.9 44 72      PTT - 11/18/2023: 12:49 AM  _   _ 52     TROPHS   Date/Time Value Ref Range Status   11/18/2023 10:52 AM 12,686 0 - 20 ng/L Final     Comment:     Previous result verified on 11/17/2023 2008 on specimen/case [id number] called with component TRPHS for procedure Troponin I, High Sensitivity, Initial with value 8,304 ng/L.   11/18/2023 05:44 AM 12,589 0 - 20 ng/L Final     Comment:     Previous result verified on 11/17/2023 2008 on specimen/case [id number] called with component TRPHS for procedure Troponin I, High Sensitivity, Initial with value 8,304 ng/L.   11/18/2023 05:44 AM 12,589 0 - 20 ng/L Final     Comment:     Previous result verified on 11/17/2023 2008 on specimen/case [id number] called with component TRPHS for procedure Troponin I, High Sensitivity, Initial with value 8,304 ng/L.     BNP   Date/Time Value Ref Range Status   11/17/2023 06:28  0 - 99 pg/mL Final     HGBA1C   Date/Time Value Ref Range Status   11/18/2023 05:44 AM 5.3 see below % Final   03/03/2020 09:34 AM 5.4 % Final     Comment:          Diagnosis of Diabetes-Adults   Non-Diabetic: < or = 5.6%   Increased risk for developing diabetes: 5.7-6.4%   Diagnostic  "of diabetes: > or = 6.5%  .       Monitoring of Diabetes                Age (y)     Therapeutic Goal (%)   Adults:          >18           <7.0   Pediatrics:    13-18           <7.5                   7-12           <8.0                   0- 6            7.5-8.5   American Diabetes Association. Diabetes Care 33(S1), Jan 2010.       LDLCALC   Date/Time Value Ref Range Status   11/18/2023 12:49  <=99 mg/dL Final     Comment:                                 Near   Borderline      AGE      Desirable  Optimal    High     High     Very High     0-19 Y     0 - 109     ---    110-129   >/= 130     ----    20-24 Y     0 - 119     ---    120-159   >/= 160     ----      >24 Y     0 -  99   100-129  130-159   160-189     >/=190       VLDL   Date/Time Value Ref Range Status   11/18/2023 12:49 AM 28 0 - 40 mg/dL Final   03/03/2020 09:34 AM 31 0 - 40 mg/dL Final      Last I/O:  I/O last 3 completed shifts:  In: 1586.7 (14.3 mL/kg) [P.O.:1140; I.V.:446.7 (4 mL/kg)]  Out: 1 (0 mL/kg) [Urine:1 (0 mL/kg/hr)]  Weight: 110.8 kg     Past Cardiology Tests (Last 3 Years):  EKG:  NSR, Inferior TWI on presentation.     Echo:  No results found for this or any previous visit from the past 1095 days.    Ejection Fractions:  No results found for: \"EF\"  Cath:  No results found for this or any previous visit from the past 1095 days.    Stress Test:  No results found for this or any previous visit from the past 1095 days.    Cardiac Imaging:  No results found for this or any previous visit from the past 1095 days.    CXR (11/17/2023):   Impression:    1.  No evidence of acute cardiopulmonary process.    Inpatient Medications:  Scheduled medications   Medication Dose Route Frequency    aspirin  81 mg oral Daily    atorvastatin  80 mg oral Nightly    heparin  4,000 Units intravenous Once    losartan  25 mg oral Daily    metoprolol tartrate  25 mg oral BID    pantoprazole  40 mg oral Daily before breakfast    perflutren protein A microsphere  0.5 " mL intravenous Once in imaging    sulfur hexafluoride microsphr  2 mL intravenous Once in imaging    ticagrelor  90 mg oral BID     PRN medications   Medication    acetaminophen    fentaNYL PF    heparin    heparin    heparin    lidocaine    midazolam    nitroglycerin    nitroglycerin    ondansetron ODT    Or    ondansetron    ondansetron    polyethylene glycol    sodium chloride 0.9%    verapamil     Continuous Medications   Medication Dose Last Rate    heparin  0-4,000 Units/hr Stopped (11/20/23 0900)    sodium chloride 0.9%   100 mL/hr (11/20/23 1049)       Physical Exam:  Physical Exam  Constitutional:       Appearance: Normal appearance.   HENT:      Head: Normocephalic.      Nose: Nose normal.      Mouth/Throat:      Mouth: Mucous membranes are dry.   Eyes:      Conjunctiva/sclera: Conjunctivae normal.   Cardiovascular:      Rate and Rhythm: Normal rate and regular rhythm.      Heart sounds: No murmur heard.     No friction rub. No gallop.   Pulmonary:      Effort: Pulmonary effort is normal.      Breath sounds: Normal breath sounds.   Abdominal:      Palpations: Abdomen is soft.   Musculoskeletal:         General: No swelling. Normal range of motion.      Cervical back: Normal range of motion and neck supple.   Skin:     General: Skin is warm and dry.   Neurological:      General: No focal deficit present.      Mental Status: He is alert and oriented to person, place, and time. Mental status is at baseline.   Psychiatric:         Mood and Affect: Mood normal.         Behavior: Behavior normal.            Assessment/Plan   Very pleasant 32yo with h/o hypertension (untreated) and obesity admitted with NSTEMI.     #NSTEMI  #Hypertension  #Dyslipidemia  #Obesity     -[facility] today with PCI to LAD   -Echocardiogram for assessment of mechanical and structural function pending review  -Can discontinue Heparin gtt  -Continue aspirin 81mg daily and Brilinta 90mg BID, Atorvastatin 80mg daily, Metoprolol tartrate 25mg  BID  -Will Consider adding ACEi/ARB or ARNI if EF <40%, pending echo  -Will follow     Peripheral IV 11/17/23 20 G Distal;Left;Upper;Anterior Arm (Active)   Site Assessment Clean;Dry;Intact 11/20/23 0800   Dressing Status Clean;Dry 11/20/23 0800   Number of days: 3       Code Status:  Full Code    I spent  minutes in the professional and overall care of this patient.        Lissette Thompson PA-C

## 2023-11-20 NOTE — PROGRESS NOTES
"Narciso Coates is a 33 y.o. male on day 3 of admission presenting with NSTEMI (non-ST elevated myocardial infarction) (CMS/HCC).    Subjective   I saw the patient today post heart cath.  He reports some nausea but denies any other symptoms.  No chest pain at this time.       Objective     Physical Exam  General: Well developed patient, alert and oriented, NAD  HEENT: no lesions, no scleral icterus, moist mucous membranes  Neuro: A&Ox3, grossly normal  CV: RRR, nrl S1, S2, no murmur, rubs, or clicks  Resp: Good bilateral air entry. No crackles,  wheezing, or rhonchi  Abdomen: Non distended, + BS, soft, non-tender, no peritoneal signs  Extremities: No lower extremity edema, + PP  Skin: No jaundice, no lesions   Psych: Appropriate mood and behavior     Last Recorded Vitals  Blood pressure 108/75, pulse 91, temperature 35.6 °C (96.1 °F), resp. rate 20, height 1.778 m (5' 10\"), weight 111 kg (244 lb 4.3 oz), SpO2 98 %.  Intake/Output last 3 Shifts:  I/O last 3 completed shifts:  In: 1586.7 (14.3 mL/kg) [P.O.:1140; I.V.:446.7 (4 mL/kg)]  Out: 1 (0 mL/kg) [Urine:1 (0 mL/kg/hr)]  Weight: 110.8 kg     Relevant Results              Scheduled medications  aspirin, 81 mg, oral, Daily  atorvastatin, 80 mg, oral, Nightly  [Held by provider] losartan, 25 mg, oral, Daily  metoprolol tartrate, 25 mg, oral, BID  pantoprazole, 40 mg, oral, Daily before breakfast  perflutren protein A microsphere, 0.5 mL, intravenous, Once in imaging  sulfur hexafluoride microsphr, 2 mL, intravenous, Once in imaging  ticagrelor, 90 mg, oral, BID      Continuous medications     PRN medications  PRN medications: acetaminophen, acetaminophen, ondansetron ODT **OR** ondansetron, ondansetron, polyethylene glycol    Results for orders placed or performed during the hospital encounter of 11/17/23 (from the past 24 hour(s))   CBC   Result Value Ref Range    WBC 11.1 4.4 - 11.3 x10*3/uL    nRBC 0.0 0.0 - 0.0 /100 WBCs    RBC 4.98 4.50 - 5.90 x10*6/uL    " Hemoglobin 14.7 13.5 - 17.5 g/dL    Hematocrit 43.8 41.0 - 52.0 %    MCV 88 80 - 100 fL    MCH 29.5 26.0 - 34.0 pg    MCHC 33.6 32.0 - 36.0 g/dL    RDW 12.1 11.5 - 14.5 %    Platelets 273 150 - 450 x10*3/uL   Heparin Assay, UFH   Result Value Ref Range    Heparin Unfractionated 0.3 See Comment Below for Therapeutic Ranges IU/mL   Sedimentation rate, automated   Result Value Ref Range    Sedimentation Rate 21 (H) 0 - 15 mm/h   Renal Function Panel   Result Value Ref Range    Glucose 91 74 - 99 mg/dL    Sodium 140 136 - 145 mmol/L    Potassium 4.0 3.5 - 5.3 mmol/L    Chloride 104 98 - 107 mmol/L    Bicarbonate 28 21 - 32 mmol/L    Anion Gap 12 10 - 20 mmol/L    Urea Nitrogen 16 6 - 23 mg/dL    Creatinine 1.30 0.50 - 1.30 mg/dL    eGFR 74 >60 mL/min/1.73m*2    Calcium 8.9 8.6 - 10.3 mg/dL    Phosphorus 3.1 2.5 - 4.9 mg/dL    Albumin 3.9 3.4 - 5.0 g/dL   Magnesium   Result Value Ref Range    Magnesium 1.93 1.60 - 2.40 mg/dL   C-Reactive Protein   Result Value Ref Range    C-Reactive Protein 3.96 (H) <1.00 mg/dL   Transthoracic Echo (TTE) Complete   Result Value Ref Range    AV pk raul 1.36     AV mn grad 4.3     LVOT diam 2.01     LV biplane EF 45     MV avg E/e' ratio 6.25     MV E/A ratio 0.83     LA vol index A/L 15.6     Tricuspid annular plane systolic excursion 2.4     RV free wall pk S' 17.00     LVIDd 4.69     RVSP 13.5     Aortic Valve Area by Continuity of VTI 1.80     Aortic Valve Area by Continuity of Peak Velocity 1.72     AV pk grad 7.4     LV A4C EF 46.9      Transthoracic Echo (TTE) Complete    Result Date: 11/20/2023   [facility], [id number] [address]               Tel [phone] and Fax [phone] TRANSTHORACIC ECHOCARDIOGRAM REPORT  Patient Name:      CINTIA M JULEE       Reading Physician:    [id number] Schuyler Alcantara MD Study Date:        11/20/2023           Ordering Provider:    [id number] PANFILO GAY                                                                JUAN R MRN/PID:           [id number]             Fellow: Accession#:        [id number]         Nurse:                Christal Paul RN Date of Birth/Age: 1990 / 33 years Sonographer:          Kathya Lucero RDCS Gender:            M                    Additional Staff: Height:            177.80 cm            Admit Date:           11/17/2023 Weight:            110.68 kg            Admission Status:     Inpatient -                                                               Routine BSA:               2.27 m2              Encounter#:           [id number]                                         Department Location:  [facility]                                                               Invasive Blood Pressure: 117 /79 mmHg Study Type:    TRANSTHORACIC ECHO (TTE) COMPLETE Diagnosis/ICD: Non ST elevation (NSTEMI) myocardial infarction-I21.4 Indication:    Acute Coronary Syndrome: Non-STEMI CPT Code:      Echo Complete w Full Doppler-[id number] Patient History: Pertinent History: Chest Pain and HTN. elevated troponin,. Study Detail: The following Echo studies were performed: 2D, M-Mode, Doppler and               color flow. Technically challenging study due to poor acoustic               windows. Definity used as a contrast agent for endocardial border               definition. Total contrast used for this procedure was 1 mL via IV               push.  PHYSICIAN INTERPRETATION: Left Ventricle: The left ventricular systolic function is mildly decreased, with an estimated ejection fraction of 40-45%. Wall motion is abnormal. The left ventricular cavity size is normal. Left ventricular diastolic filling was indeterminate. LV Wall Scoring: The entire apex and mid and apical anterior septum are hypokinetic. Left Atrium: The left atrium is normal in size. Right Ventricle: The right ventricle is normal in size.  There is normal right ventricular global systolic function. Right Atrium: The right atrium is normal in size. Aortic Valve: The aortic valve is trileaflet. There is no evidence of aortic valve stenosis. There is no evidence of aortic valve regurgitation. The peak instantaneous gradient of the aortic valve is 7.4 mmHg. The mean gradient of the aortic valve is 4.3 mmHg. Mitral Valve: The mitral valve is normal in structure. There is trace mitral valve regurgitation. Tricuspid Valve: The tricuspid valve is structurally normal. There is trace tricuspid regurgitation. The right ventricular systolic pressure is unable to be estimated. Pulmonic Valve: The pulmonic valve is not well visualized. There is physiologic pulmonic valve regurgitation. Pericardium: There is no pericardial effusion noted. Aorta: The aortic root is normal. Systemic Veins: The inferior vena cava appears to be of normal size. There is IVC inspiratory collapse greater than 50%. In comparison to the previous echocardiogram(s): There are no prior studies on this patient for comparison purposes.  CONCLUSIONS:  1. Left ventricular systolic function is mildly decreased with a 40-45% estimated ejection fraction.  2. Entire apex and mid and apical anterior septum are abnormal. QUANTITATIVE DATA SUMMARY: 2D MEASUREMENTS:                          Normal Ranges: IVSd:          1.14 cm   (0.6-1.1cm) LVPWd:         1.07 cm   (0.6-1.1cm) LVIDd:         4.69 cm   (3.9-5.9cm) LVIDs:         3.58 cm LV Mass Index: 82.8 g/m2 LV % FS        23.6 % LA VOLUME:                              Normal Ranges: LA Vol A4C:       34.4 ml    (22+/-6mL/m2) LA Vol A2C:       32.4 ml LA Vol BP:        35.4 ml LA Vol Index A4C: 15.1 ml/m2 LA Vol Index A2C: 14.3 ml/m2 LA Vol Index BP:  15.6 ml/m2 LA Volume Index:  15.6 ml/m2 LA Vol A4C:       32.3 ml LA Vol A2C:       29.3 ml RA VOLUME BY A/L METHOD:                       Normal Ranges: RA Area A4C: 13.6 cm2 M-MODE MEASUREMENTS:                   Normal Ranges: Ao Root: 3.40 cm (2.0-3.7cm) LAs:     3.44 cm (2.7-4.0cm) LV SYSTOLIC FUNCTION BY 2D PLANIMETRY (MOD):                     Normal Ranges: EF-A4C View: 46.9 % (>=55%) EF-A2C View: 44.6 % EF-Biplane:  45.0 % LV DIASTOLIC FUNCTION:                            Normal Ranges: MV Peak E:      0.63 m/s   (0.7-1.2 m/s) MV Peak A:      0.75 m/s   (0.42-0.7 m/s) E/A Ratio:      0.83       (1.0-2.2) MV e'           0.10 m/s   (>8.0) MV lateral e'   0.10 m/s MV medial e'    0.07 m/s E/e' Ratio:     6.25       (<8.0) PulmV Sys Adam:  49.72 cm/s PulmV Sears Adam: 46.92 cm/s PulmV S/D Adam:  1.06 MITRAL VALVE:                 Normal Ranges: MV DT: 129 msec (150-240msec) AORTIC VALVE:                                   Normal Ranges: AoV Vmax:                1.36 m/s (<=1.7m/s) AoV Peak P.4 mmHg (<20mmHg) AoV Mean P.3 mmHg (1.7-11.5mmHg) LVOT Max Adam:            0.74 m/s (<=1.1m/s) AoV VTI:                 24.78 cm (18-25cm) LVOT VTI:                14.08 cm LVOT Diameter:           2.01 cm  (1.8-2.4cm) AoV Area, VTI:           1.80 cm2 (2.5-5.5cm2) AoV Area,Vmax:           1.72 cm2 (2.5-4.5cm2) AoV Dimensionless Index: 0.57  RIGHT VENTRICLE: RV Basal 3.90 cm RV Mid   3.20 cm RV Major 7.8 cm TAPSE:   24.0 mm RV s'    0.17 m/s TRICUSPID VALVE/RVSP:                             Normal Ranges: Peak TR Velocity: 1.62 m/s RV Syst Pressure: 13.5 mmHg (< 30mmHg) IVC Diam:         1.40 cm PULMONIC VALVE:                      Normal Ranges: PV Max Adam: 1.1 m/s  (0.6-0.9m/s) PV Max P.9 mmHg Pulmonary Veins: PulmV Sears Adam: 46.92 cm/s PulmV S/D Adam:  1.06 PulmV Sys Adam:  49.72 cm/s  [id number] Schuyler Alcantara MD Electronically signed on 2023 at 11:47:26 AM  Wall Scoring  ** Final **     XR chest 1 view    Result Date: 2023  Interpreted By:  Yaw Alba, STUDY: XR CHEST 1 VIEW;  2023 6:32 pm   INDICATION: Signs/Symptoms:Chest Pain.   COMPARISON: None.   ACCESSION  NUMBER(S): [id number]   ORDERING CLINICIAN: JAYLYN ARGUETA   FINDINGS: AP radiograph of the chest was provided.       CARDIOMEDIASTINAL SILHOUETTE: Cardiomediastinal silhouette is normal in size and configuration.   LUNGS: No consolidation, pleural effusion, or pneumothorax is evident.   ABDOMEN: No remarkable upper abdominal findings.   BONES: No acute osseous changes.       1.  No evidence of acute cardiopulmonary process.     MACRO: None   Signed by: Yaw Alba 11/17/2023 6:47 PM Dictation workstation:   [id number]           Assessment/Plan   Principal Problem:    NSTEMI (non-ST elevated myocardial infarction) (CMS/[facility])    # NSTEMI r/o  # Elevated Troponin  - Sx: Substernal non-radiating chest pain  - Troponin: 8304 --> 4042. Repeat ordered  - EKG: T wave inversion in I, V1, V2, V3   - Heart score: 4 point; risk of MACE of 12-16%  - PRAKASH Score: 2 points; 8% all cause mortality  - CXR: No evidence of acute cardiopulmonary process  -  mg once given in ED, On ASA 81 mg PO daily  - Repeat troponin 0049 14,459 @ 0544 [id number] and @ 1052 [id number]  - TTE: 1. Left ventricular systolic function is mildly decreased with a 40-45% estimated ejection fraction, 2. Entire apex and mid and apical anterior septum are abnormal.  - Stent placed in the LAD during LHC  Plan  - On Brillinta 90 mg BID  - Atorvastatin 80 mg   - metoprolol tartrate 25 mg BID  - On continuous low intensity Heparin gtt per ACS guidelines  - Pt. Currently on telemetry in Stepdown  - Cardiology following, appreciate recs        IVF: None  Electrolytes: Mg >2, K >4  Diet: Regular Diet   GI ppx: None  DVT ppx: Heparin gtt  Antibiotics: None  Code: Full Code      Disposition: Admitted for NSTEMI, echo and LHC Monday.  eLOS >48Hr           Oliver Rodriguez DO

## 2023-11-20 NOTE — PROGRESS NOTES
11/20/23 1401   Discharge Planning   Living Arrangements Spouse/significant other   Support Systems Spouse/significant other   Assistance Needed home with spouse, x3, independent with ADL's, drives, room air   Type of Residence Private residence   Number of Stairs to Enter Residence 4   Number of Stairs Within Residence 14   Do you have animals or pets at home? Yes   Type of Animals or Pets 2 cats   Who is requesting discharge planning? Provider   Home or Post Acute Services None   Patient expects to be discharged to: Home with spouse, no needs-denied need for HHC   Does the patient need discharge transport arranged? No

## 2023-11-21 ENCOUNTER — HOSPITAL ENCOUNTER (OUTPATIENT)
Dept: CARDIOLOGY | Facility: HOSPITAL | Age: 33
Discharge: HOME | End: 2023-11-21
Payer: COMMERCIAL

## 2023-11-21 ENCOUNTER — PHARMACY VISIT (OUTPATIENT)
Dept: PHARMACY | Facility: CLINIC | Age: 33
End: 2023-11-21
Payer: MEDICARE

## 2023-11-21 VITALS
OXYGEN SATURATION: 98 % | DIASTOLIC BLOOD PRESSURE: 78 MMHG | HEIGHT: 70 IN | TEMPERATURE: 97.3 F | RESPIRATION RATE: 22 BRPM | HEART RATE: 114 BPM | SYSTOLIC BLOOD PRESSURE: 117 MMHG | WEIGHT: 247.7 LBS | BODY MASS INDEX: 35.46 KG/M2

## 2023-11-21 DIAGNOSIS — I21.4 NSTEMI (NON-ST ELEVATED MYOCARDIAL INFARCTION) (MULTI): Primary | ICD-10-CM

## 2023-11-21 LAB
ALBUMIN SERPL BCP-MCNC: 3.9 G/DL (ref 3.4–5)
ANION GAP SERPL CALC-SCNC: 14 MMOL/L (ref 10–20)
APPEARANCE UR: CLEAR
ATRIAL RATE: 81 BPM
ATRIAL RATE: 83 BPM
BILIRUB UR STRIP.AUTO-MCNC: NEGATIVE MG/DL
BUN SERPL-MCNC: 16 MG/DL (ref 6–23)
CALCIUM SERPL-MCNC: 8.5 MG/DL (ref 8.6–10.3)
CHLORIDE SERPL-SCNC: 106 MMOL/L (ref 98–107)
CHOLEST SERPL-MCNC: 150 MG/DL (ref 0–199)
CHOLESTEROL/HDL RATIO: 5.5
CO2 SERPL-SCNC: 22 MMOL/L (ref 21–32)
COLOR UR: YELLOW
CREAT SERPL-MCNC: 0.96 MG/DL (ref 0.5–1.3)
ERYTHROCYTE [DISTWIDTH] IN BLOOD BY AUTOMATED COUNT: 11.9 % (ref 11.5–14.5)
GFR SERPL CREATININE-BSD FRML MDRD: >90 ML/MIN/1.73M*2
GLUCOSE SERPL-MCNC: 97 MG/DL (ref 74–99)
GLUCOSE UR STRIP.AUTO-MCNC: NEGATIVE MG/DL
HCT VFR BLD AUTO: 42 % (ref 41–52)
HDLC SERPL-MCNC: 27.4 MG/DL
HGB BLD-MCNC: 14.1 G/DL (ref 13.5–17.5)
HOLD SPECIMEN: NORMAL
KETONES UR STRIP.AUTO-MCNC: ABNORMAL MG/DL
LDLC SERPL CALC-MCNC: 99 MG/DL
LEUKOCYTE ESTERASE UR QL STRIP.AUTO: NEGATIVE
MAGNESIUM SERPL-MCNC: 2.01 MG/DL (ref 1.6–2.4)
MCH RBC QN AUTO: 29.3 PG (ref 26–34)
MCHC RBC AUTO-ENTMCNC: 33.6 G/DL (ref 32–36)
MCV RBC AUTO: 87 FL (ref 80–100)
MUCOUS THREADS #/AREA URNS AUTO: NORMAL /LPF
NITRITE UR QL STRIP.AUTO: NEGATIVE
NON HDL CHOLESTEROL: 123 MG/DL (ref 0–149)
NRBC BLD-RTO: 0 /100 WBCS (ref 0–0)
P AXIS: 29 DEGREES
P AXIS: 3 DEGREES
P OFFSET: 181 MS
P OFFSET: 187 MS
P ONSET: 134 MS
P ONSET: 138 MS
PH UR STRIP.AUTO: 5 [PH]
PHOSPHATE SERPL-MCNC: 3.3 MG/DL (ref 2.5–4.9)
PLATELET # BLD AUTO: 283 X10*3/UL (ref 150–450)
POTASSIUM SERPL-SCNC: 3.3 MMOL/L (ref 3.5–5.3)
PR INTERVAL: 154 MS
PR INTERVAL: 160 MS
PROT UR STRIP.AUTO-MCNC: ABNORMAL MG/DL
Q ONSET: 214 MS
Q ONSET: 215 MS
QRS COUNT: 14 BEATS
QRS COUNT: 14 BEATS
QRS DURATION: 94 MS
QRS DURATION: 94 MS
QT INTERVAL: 412 MS
QT INTERVAL: 414 MS
QTC CALCULATION(BAZETT): 480 MS
QTC CALCULATION(BAZETT): 484 MS
QTC FREDERICIA: 457 MS
QTC FREDERICIA: 459 MS
R AXIS: -13 DEGREES
R AXIS: -22 DEGREES
RBC # BLD AUTO: 4.81 X10*6/UL (ref 4.5–5.9)
RBC # UR STRIP.AUTO: NEGATIVE /UL
RBC #/AREA URNS AUTO: NORMAL /HPF
SODIUM SERPL-SCNC: 139 MMOL/L (ref 136–145)
SP GR UR STRIP.AUTO: 1.06
T AXIS: 121 DEGREES
T AXIS: 122 DEGREES
T OFFSET: 421 MS
T OFFSET: 421 MS
TRIGL SERPL-MCNC: 120 MG/DL (ref 0–149)
UROBILINOGEN UR STRIP.AUTO-MCNC: <2 MG/DL
VENTRICULAR RATE: 81 BPM
VENTRICULAR RATE: 83 BPM
VLDL: 24 MG/DL (ref 0–40)
WBC # BLD AUTO: 13.8 X10*3/UL (ref 4.4–11.3)
WBC #/AREA URNS AUTO: NORMAL /HPF

## 2023-11-21 PROCEDURE — 99239 HOSP IP/OBS DSCHRG MGMT >30: CPT

## 2023-11-21 PROCEDURE — 93005 ELECTROCARDIOGRAM TRACING: CPT

## 2023-11-21 PROCEDURE — 2500000004 HC RX 250 GENERAL PHARMACY W/ HCPCS (ALT 636 FOR OP/ED)

## 2023-11-21 PROCEDURE — 80061 LIPID PANEL: CPT | Performed by: INTERNAL MEDICINE

## 2023-11-21 PROCEDURE — 36415 COLL VENOUS BLD VENIPUNCTURE: CPT | Performed by: INTERNAL MEDICINE

## 2023-11-21 PROCEDURE — 81001 URINALYSIS AUTO W/SCOPE: CPT

## 2023-11-21 PROCEDURE — 85027 COMPLETE CBC AUTOMATED: CPT

## 2023-11-21 PROCEDURE — 36415 COLL VENOUS BLD VENIPUNCTURE: CPT

## 2023-11-21 PROCEDURE — 94760 N-INVAS EAR/PLS OXIMETRY 1: CPT

## 2023-11-21 PROCEDURE — 80069 RENAL FUNCTION PANEL: CPT

## 2023-11-21 PROCEDURE — 83735 ASSAY OF MAGNESIUM: CPT

## 2023-11-21 PROCEDURE — 2500000001 HC RX 250 WO HCPCS SELF ADMINISTERED DRUGS (ALT 637 FOR MEDICARE OP): Performed by: NURSE PRACTITIONER

## 2023-11-21 PROCEDURE — 2500000001 HC RX 250 WO HCPCS SELF ADMINISTERED DRUGS (ALT 637 FOR MEDICARE OP)

## 2023-11-21 PROCEDURE — 99232 SBSQ HOSP IP/OBS MODERATE 35: CPT | Performed by: PHYSICIAN ASSISTANT

## 2023-11-21 RX ORDER — ATORVASTATIN CALCIUM 80 MG/1
80 TABLET, FILM COATED ORAL NIGHTLY
Qty: 90 TABLET | Refills: 2 | Status: SHIPPED | OUTPATIENT
Start: 2023-11-21

## 2023-11-21 RX ORDER — NAPROXEN SODIUM 220 MG/1
81 TABLET, FILM COATED ORAL DAILY
Qty: 30 TABLET | Refills: 0 | Status: SHIPPED | OUTPATIENT
Start: 2023-11-22 | End: 2023-12-20

## 2023-11-21 RX ORDER — METOPROLOL TARTRATE 25 MG/1
25 TABLET, FILM COATED ORAL 2 TIMES DAILY
Qty: 60 TABLET | Refills: 11 | Status: SHIPPED | OUTPATIENT
Start: 2023-11-21 | End: 2024-11-20

## 2023-11-21 RX ORDER — POTASSIUM CHLORIDE 20 MEQ/1
40 TABLET, EXTENDED RELEASE ORAL ONCE
Status: COMPLETED | OUTPATIENT
Start: 2023-11-21 | End: 2023-11-21

## 2023-11-21 RX ORDER — LOSARTAN POTASSIUM 25 MG/1
25 TABLET ORAL DAILY
Qty: 30 TABLET | Refills: 11 | Status: SHIPPED | OUTPATIENT
Start: 2023-11-22 | End: 2024-05-09

## 2023-11-21 RX ADMIN — POTASSIUM CHLORIDE 40 MEQ: 1500 TABLET, EXTENDED RELEASE ORAL at 08:47

## 2023-11-21 RX ADMIN — PANTOPRAZOLE SODIUM 40 MG: 40 TABLET, DELAYED RELEASE ORAL at 08:39

## 2023-11-21 RX ADMIN — TICAGRELOR 90 MG: 90 TABLET ORAL at 08:39

## 2023-11-21 RX ADMIN — METOPROLOL TARTRATE 25 MG: 25 TABLET, FILM COATED ORAL at 08:39

## 2023-11-21 RX ADMIN — ASPIRIN 81 MG CHEWABLE TABLET 81 MG: 81 TABLET CHEWABLE at 08:39

## 2023-11-21 ASSESSMENT — COGNITIVE AND FUNCTIONAL STATUS - GENERAL
MOBILITY SCORE: 24
DAILY ACTIVITIY SCORE: 24

## 2023-11-21 ASSESSMENT — PAIN SCALES - GENERAL: PAINLEVEL_OUTOF10: 0 - NO PAIN

## 2023-11-21 ASSESSMENT — PAIN - FUNCTIONAL ASSESSMENT: PAIN_FUNCTIONAL_ASSESSMENT: 0-10

## 2023-11-21 NOTE — PROGRESS NOTES
Subjective Data:  Resting in bed, doing well. Denies any chest pain, chest pressure, palpitations, dizziness, cough, shortness of breath, orthopnea, edema.      Overnight Events:    No acute events reported.      Objective Data:  Last Recorded Vitals:  Vitals:    11/20/23 1430 11/20/23 1958 11/21/23 0500 11/21/23 0742   BP: 108/75 115/77 117/78    BP Location:       Patient Position:       Pulse: 91 92 (!) 114    Resp: 20 19 22    Temp:  36.8 °C (98.3 °F) 36.3 °C (97.3 °F)    TempSrc:       SpO2: 98% 97% 98% 98%   Weight:   112 kg (247 lb 11.2 oz)    Height:           Last Labs:  CBC - 11/21/2023:  5:29 AM  13.8 14.1 283    42.0      CMP - 11/21/2023:  5:29 AM  8.5 6.5 64 --- 0.9   3.3 3.9 44 72      PTT - 11/18/2023: 12:49 AM  _   _ 52     TROPHS   Date/Time Value Ref Range Status   11/18/2023 10:52 AM 12,686 0 - 20 ng/L Final     Comment:     Previous result verified on 11/17/2023 2008 on specimen/case [id number] called with component [employer] for procedure Troponin I, High Sensitivity, Initial with value 8,304 ng/L.   11/18/2023 05:44 AM 12,589 0 - 20 ng/L Final     Comment:     Previous result verified on 11/17/2023 2008 on specimen/case [id number] called with component TRPHS for procedure Troponin I, High Sensitivity, Initial with value 8,304 ng/L.   11/18/2023 05:44 AM 12,589 0 - 20 ng/L Final     Comment:     Previous result verified on 11/17/2023 2008 on specimen/case [id number] called with component TRPHS for procedure Troponin I, High Sensitivity, Initial with value 8,304 ng/L.     BNP   Date/Time Value Ref Range Status   11/17/2023 06:28  0 - 99 pg/mL Final     HGBA1C   Date/Time Value Ref Range Status   11/18/2023 05:44 AM 5.3 see below % Final   03/03/2020 09:34 AM 5.4 % Final     Comment:          Diagnosis of Diabetes-Adults   Non-Diabetic: < or = 5.6%   Increased risk for developing diabetes: 5.7-6.4%   Diagnostic of diabetes: > or = 6.5%  .       Monitoring of Diabetes                 Age (y)     Therapeutic Goal (%)   Adults:          >18           <7.0   Pediatrics:    13-18           <7.5                   7-12           <8.0                   0- 6            7.5-8.5   American Diabetes Association. Diabetes Care 33(S1), Jan 2010.       LDLCALC   Date/Time Value Ref Range Status   11/21/2023 05:29 AM 99 <=99 mg/dL Final     Comment:                                 Near   Borderline      AGE      Desirable  Optimal    High     High     Very High     0-19 Y     0 - 109     ---    110-129   >/= 130     ----    20-24 Y     0 - 119     ---    120-159   >/= 160     ----      >24 Y     0 -  99   100-129  130-159   160-189     >/=190     11/18/2023 12:49  <=99 mg/dL Final     Comment:                                 Near   Borderline      AGE      Desirable  Optimal    High     High     Very High     0-19 Y     0 - 109     ---    110-129   >/= 130     ----    20-24 Y     0 - 119     ---    120-159   >/= 160     ----      >24 Y     0 -  99   100-129  130-159   160-189     >/=190       VLDL   Date/Time Value Ref Range Status   11/21/2023 05:29 AM 24 0 - 40 mg/dL Final   11/18/2023 12:49 AM 28 0 - 40 mg/dL Final   03/03/2020 09:34 AM 31 0 - 40 mg/dL Final      Last I/O:  I/O last 3 completed shifts:  In: 1321.4 (11.8 mL/kg) [P.O.:240; I.V.:1081.4 (9.6 mL/kg)]  Out: 5 (0 mL/kg) [Blood:5]  Weight: 112.4 kg     Past Cardiology Tests (Last 3 Years):  EKG:  No results found for this or any previous visit from the past 1095 days.    Echo:  Transthoracic Echo (TTE) Complete 11/20/2023  CONCLUSIONS:   1. Left ventricular systolic function is mildly decreased with a 40-45% estimated ejection fraction.   2. Entire apex and mid and apical anterior septum are abnormal.    Ejection Fractions:  EF   Date/Time Value Ref Range Status   11/20/2023 09:02 AM 45       Cath:  Cardiac catheterization - coronary 11/21/2023  CONCLUSIONS:   1. Left main: no significant angiographic disease.   2. LAD: 100% subtotal  proximal NSTEMI culprit lesion, distal vesel fills via faint right to left collaterls.   3. LCx: no significant angiographic disease -- distal LCx became occluded from thromboembolism from proximal LAD during PCI.   4. RCA: no significant angiographic disease.   5. LVEDP 19mmhg, no aortic stenosis on LV-Ao gradient.   6. Successful PCI to proximal NSTEMI culprit lesion with one Synergy ANNIKA.   7. Successful PTCA to distal LCx/OM3 embolism that occurred during LAD PCI--0% residual stenosis and PRAKASH 3 flow.    Stress Test:  No results found for this or any previous visit from the past 1095 days.    Cardiac Imaging:  No results found for this or any previous visit from the past 1095 days.      Inpatient Medications:  Scheduled medications   Medication Dose Route Frequency    aspirin  81 mg oral Daily    atorvastatin  80 mg oral Nightly    losartan  25 mg oral Daily    metoprolol tartrate  25 mg oral BID    pantoprazole  40 mg oral Daily before breakfast    perflutren protein A microsphere  0.5 mL intravenous Once in imaging    sulfur hexafluoride microsphr  2 mL intravenous Once in imaging    ticagrelor  90 mg oral BID     PRN medications   Medication    acetaminophen    acetaminophen    ondansetron ODT    Or    ondansetron    ondansetron    polyethylene glycol     Continuous Medications   Medication Dose Last Rate       Physical Exam:  Physical Exam  Constitutional:       Appearance: Normal appearance.   HENT:      Head: Normocephalic.      Nose: Nose normal.      Mouth/Throat:      Mouth: Mucous membranes are dry.   Eyes:      Conjunctiva/sclera: Conjunctivae normal.   Cardiovascular:      Rate and Rhythm: Normal rate and regular rhythm.      Heart sounds: No murmur heard.     No friction rub. No gallop.   Pulmonary:      Effort: Pulmonary effort is normal.      Breath sounds: Normal breath sounds.   Abdominal:      Palpations: Abdomen is soft.   Musculoskeletal:         General: No swelling. Normal range of motion.       Cervical back: Normal range of motion and neck supple.   Skin:     General: Skin is warm and dry.      Capillary Refill: Capillary refill takes less than 2 seconds.   Neurological:      General: No focal deficit present.      Mental Status: He is alert. Mental status is at baseline.   Psychiatric:         Mood and Affect: Mood normal.         Behavior: Behavior normal.            Assessment/Plan   Very pleasant 32yo with h/o hypertension (untreated) and obesity admitted with NSTEMI.     #NSTEMI s/p PCI to LAD, PTCA to distal Lcx/OM3  #Hypertension  #Dyslipidemia  #Obesity     -Echocardiogram recommended, reviewed with mild decreased EF 40-45%, apex, mid apical and anterior septum wall motion abnormalities   -Can discontinue Heparin gtt  -Continue aspirin 81mg daily and Brilinta 90mg BID, Atorvastatin 80mg daily, Metoprolol tartrate 25mg BID  -Resume Losartan 25mg daily  -Cardiac rehab, dietician referral placed  -Ok to discharge per CV standpoint  -Will follow up in clinic with Lucy Aparicio CNP in 3 weeks.   -Will sign off.          Code Status:  Full Code    I spent 45 minutes in the professional and overall care of this patient.        Lissette Thompson PA-C

## 2023-11-21 NOTE — DISCHARGE INSTRUCTIONS
1) Please, take your home medications as instructed after being discharged from the hospital.    NEW MEDICATIONS:  ASA 81mg daily  Atorvastatin 80mg daily  Losartan 25mg daily  Metoprolol tartrate 25mg twice daily  Brilinta 90mg twice daily     2) Please, follow-up with your primary care provider within 7 to 14 days after leaving the hospital. /appointment services has been requested to make an appointment for you, however if you do not hear back from them within 1 to 2 days, please call your primary physician's office to schedule an appointment. Bring your photo ID and insurance card to your appointment.   [facility]  services can be reached at [phone] and appointment services can be reached at [phone].    - Please, call   or appointment services and schedule a follow-up with your PCP within 1-2 weeks after you leave the hospital.    3) If you experience any worsening symptoms or have any concerns, please contact your primary care provider to schedule an appointment. If you cannot get in touch with your primary care physician, please return to the nearest emergency room or urgent care clinic for an evaluation and treatment.    Thank you for choosing [facility] and allowing us to partake in your medical care!    - Your primary care inpatient team.

## 2023-11-21 NOTE — DISCHARGE SUMMARY
Discharge Diagnosis  NSTEMI (non-ST elevated myocardial infarction) (CMS/[facility])    Issues Requiring Follow-Up  NSTEMI follow up    Discharge Meds     Your medication list        START taking these medications        Instructions Last Dose Given Next Dose Due   aspirin 81 mg chewable tablet  Start taking on: November 22, 2023      Chew 1 tablet (81 mg) once daily. May purchase over the counter. Do not start before November 22, 2023.       atorvastatin 80 mg tablet  Commonly known as: Lipitor      Take 1 tablet (80 mg) by mouth once daily at bedtime.       losartan 25 mg tablet  Commonly known as: Cozaar  Start taking on: November 22, 2023      Take 1 tablet (25 mg) by mouth once daily. Do not start before November 22, 2023.       metoprolol tartrate 25 mg tablet  Commonly known as: Lopressor      Take 1 tablet (25 mg) by mouth 2 times a day.       ticagrelor 90 mg tablet  Commonly known as: Brilinta      Take 1 tablet (90 mg) by mouth 2 times a day.       ticagrelor 90 mg tablet  Commonly known as: Brilinta  Notes to patient: Duplicate      Take 1 tablet (90 mg) by mouth 2 times a day for 60 doses.              CONTINUE taking these medications        Instructions Last Dose Given Next Dose Due   acetaminophen 325 mg tablet  Commonly known as: Tylenol                     Where to Get Your Medications        These medications were sent to [facility] #[id number] [place] [id number] [address] [address]      Phone: [phone]   aspirin 81 mg chewable tablet  atorvastatin 80 mg tablet  losartan 25 mg tablet  metoprolol tartrate 25 mg tablet  ticagrelor 90 mg tablet       These medications were sent to [facility]  [address] [facility] [id number]      Hours: 9 AM to 5 PM Mon-Fri Phone: [phone]   ticagrelor 90 mg tablet         Test Results Pending At Discharge  Pending Labs       No current pending labs.            Hospital Course  Patient presented to [facility]  ED on 11/17 with complaints of chest pain.  Troponin elevated to 8304 with concern for NSTEMI.  EKG showed T wave inversion.  Patient was admitted to stepdown unit and placed on telemetry. Cardiology was consulted who recommended management per NSTEMI protocol.  Cardiology performed left heart catheterization for definitive ischemic evaluation and placed a stent in the left anterior descending coronary artery.  Echocardiogram showed reduced ejection fraction to 40%.  Patient was started on metoprolol tartrate 25 mg twice daily, high intensity statin, aspirin 81 mg, and Brilinta 90 mg twice daily.  Patient is doing well postprocedure and is hemodynamically stable.  Cleared for discharge from cardiology standpoint.    Pertinent Physical Exam At Time of Discharge  Physical Exam  General: Well developed patient, alert and oriented, NAD  HEENT: no lesions, no scleral icterus, moist mucous membranes  Neuro: A&Ox3, grossly normal  CV: RRR, nrl S1, S2, no murmur, rubs, or clicks  Resp: Good bilateral air entry. No crackles,  wheezing, or rhonchi  Abdomen: Non distended, + BS, soft, non-tender, no peritoneal signs  Extremities: No lower extremity edema, + PP  Skin: No jaundice, no lesions   Psych: Appropriate mood and behavior     Outpatient Follow-Up  Future Appointments   Date Time Provider Department Center   12/5/2023 11:30 AM ROMAIN Garcia-CNP [id number]         Oliver Rodriguez DO

## 2023-11-21 NOTE — HOSPITAL COURSE
Patient presented to [facility] ED on 11/17 with complaints of chest pain.  Troponin elevated to 8304 with concern for NSTEMI.  EKG showed T wave inversion.  Patient was admitted to stepdown unit and placed on telemetry. Cardiology was consulted who recommended management per NSTEMI protocol.  Cardiology performed left heart catheterization for definitive ischemic evaluation and placed a stent in the left anterior descending coronary artery.  Echocardiogram showed reduced ejection fraction to 40%.  Patient was started on metoprolol tartrate 25 mg twice daily, high intensity statin, aspirin 81 mg, and Brilinta 90 mg twice daily.  Patient is doing well postprocedure and is hemodynamically stable. Cleared for discharge from cardiology standpoint on 11/21.

## 2023-11-21 NOTE — CARE PLAN
The patient's goals for the shift include Pt will perform ADLs pain free during shift.    The clinical goals for the shift include to be d/c    Over the shift, the patient did not make progress toward the following goals. Barriers to progression include the patient. Recommendations to address these barriers include education on discharge instructions.

## 2023-11-21 NOTE — CARE PLAN
The patient's goals for the shift include Pt will perform ADLs pain free during shift.    The clinical goals for the shift include will have no s/s bleeding to cath site    Clinical goals met. Remained hds this shift, urine sent to lab 4175

## 2023-11-22 ENCOUNTER — TELEPHONE (OUTPATIENT)
Dept: CARDIAC REHAB | Facility: HOSPITAL | Age: 33
End: 2023-11-22
Payer: COMMERCIAL

## 2023-11-24 NOTE — SIGNIFICANT EVENT
Follow Up Phone Call    Outgoing phone call    Spoke to: Narciso Coates Relationship:self   Phone number: [phone]      Outcome: contacted patient/ family   Chief Complaint   Patient presents with    Chest Pain     Started at 0200. Nausea no vomiting. No radiation to other areas.           Diagnosis:Not applicable  States he is feeling better. Cath site free of redness, pain, swelling or drainage.No further questions or concerns.

## 2023-12-05 ENCOUNTER — OFFICE VISIT (OUTPATIENT)
Dept: CARDIOLOGY | Facility: HOSPITAL | Age: 33
End: 2023-12-05
Payer: COMMERCIAL

## 2023-12-05 VITALS
SYSTOLIC BLOOD PRESSURE: 129 MMHG | BODY MASS INDEX: 36.66 KG/M2 | WEIGHT: 255.51 LBS | HEART RATE: 81 BPM | DIASTOLIC BLOOD PRESSURE: 87 MMHG | OXYGEN SATURATION: 98 %

## 2023-12-05 DIAGNOSIS — I10 ESSENTIAL HYPERTENSION: ICD-10-CM

## 2023-12-05 DIAGNOSIS — I21.4 NSTEMI (NON-ST ELEVATED MYOCARDIAL INFARCTION) (MULTI): Primary | ICD-10-CM

## 2023-12-05 PROCEDURE — 3079F DIAST BP 80-89 MM HG: CPT | Performed by: NURSE PRACTITIONER

## 2023-12-05 PROCEDURE — 99214 OFFICE O/P EST MOD 30 MIN: CPT | Performed by: NURSE PRACTITIONER

## 2023-12-05 PROCEDURE — 3074F SYST BP LT 130 MM HG: CPT | Performed by: NURSE PRACTITIONER

## 2023-12-05 PROCEDURE — 1036F TOBACCO NON-USER: CPT | Performed by: NURSE PRACTITIONER

## 2023-12-05 NOTE — PROGRESS NOTES
Chief Complaint:   Hospital follow up      History Of Present Illness:    Narciso Coates is a 33 y.o. male with h/o hypertension presenting for follow up s/p recent hospitalization 11/17/2023-11/21/2023 for NSTEMI s/p coronary angiography 11/20/2023 found to have 100% subtotally occluded proximal LAD (faint R-L collaterals) s/p ANNIKA as well as PTCA to distal Lcx as it became occluded from thromboembolism from prox LAD during PCI.  EF 40-45% with apical hypokinesis.   He is doing quite well since discharge.  Denies chest pain or pressure.  Feels mildly SOB at times--?Brilinta reaction as cannot take a full breath of air in.  Had significant ecchymosis to right arm post cath extending from right wrist to mid bicep-- improving, no hematoma, 2+right radial pulse.       Last Recorded Vitals:  Vitals:    12/05/23 1145   BP: 129/87   Pulse: 81   SpO2: 98%   Weight: 116 kg (255 lb 8.2 oz)       Past Medical History:  He has a past medical history of Hypertension.    Past Surgical History:  He has a past surgical history that includes Abdominal surgery and Cardiac catheterization (N/A, 11/20/2023).      Social History:  He reports that he has never smoked. He has quit using smokeless tobacco.  His smokeless tobacco use included chew. He reports that he does not currently use alcohol. He reports that he does not use drugs.    Family History:  No family history on file.     Allergies:  Patient has no known allergies.    Outpatient Medications:  Current Outpatient Medications   Medication Instructions    acetaminophen (Tylenol) 325 mg tablet 2-3 tablets, oral, Every 6 hours PRN    aspirin 81 mg, oral, Daily, May purchase over the counter.    atorvastatin (LIPITOR) 80 mg, oral, Nightly    losartan (COZAAR) 25 mg, oral, Daily    metoprolol tartrate (LOPRESSOR) 25 mg, oral, 2 times daily    ticagrelor (BRILINTA) 90 mg, oral, 2 times daily       Physical Exam:  Constitutional: Pleasant, Awake/Alert/Oriented to person place and time.  No distress  Head: Atraumatic, Normocephalic  Eyes: EOMI. JONATHAN  Neck:  No JVD  Cardiovascular: Regular rate and rhythm, S1, S2. No extra heart sounds or murmurs  Respiratory: Clear to auscultation bilaterally. No wheezing, rales or rhonchi. Good chest wall expansion  Abdomen: Soft, Nontender, Obese. Bowel sounds appreciated  Musculoskeletal: ROM intact. Muscle strength grossly intact upper and lower extremities 5/5.   Neurological: CNII-XII intact. Sensation grossly intact  Extremities: Warm and dry. No acute rashes and lesions  Psychiatric: Appropriate mood and affect       Last Labs:  CBC -  Lab Results   Component Value Date    WBC 13.8 (H) 11/21/2023    HGB 14.1 11/21/2023    HCT 42.0 11/21/2023    MCV 87 11/21/2023     11/21/2023       CMP -  Lab Results   Component Value Date    CALCIUM 8.5 (L) 11/21/2023    PHOS 3.3 11/21/2023    PROT 6.5 11/19/2023    ALBUMIN 3.9 11/21/2023    AST 64 (H) 11/19/2023    ALT 44 11/19/2023    ALKPHOS 72 11/19/2023    BILITOT 0.9 11/19/2023       LIPID PANEL -   Lab Results   Component Value Date    CHOL 150 11/21/2023    TRIG 120 11/21/2023    HDL 27.4 11/21/2023    CHHDL 5.5 11/21/2023    LDLF 170 (H) 03/03/2020    VLDL 24 11/21/2023    NHDL 123 11/21/2023       RENAL FUNCTION PANEL -   Lab Results   Component Value Date    GLUCOSE 97 11/21/2023     11/21/2023    K 3.3 (L) 11/21/2023     11/21/2023    CO2 22 11/21/2023    ANIONGAP 14 11/21/2023    BUN 16 11/21/2023    CREATININE 0.96 11/21/2023    CALCIUM 8.5 (L) 11/21/2023    PHOS 3.3 11/21/2023    ALBUMIN 3.9 11/21/2023        Lab Results   Component Value Date     (H) 11/17/2023    HGBA1C 5.3 11/18/2023       Last Cardiology Tests:  Echo:  Transthoracic Echo (TTE) Complete 11/20/2023  CONCLUSIONS:   1. Left ventricular systolic function is mildly decreased with a 40-45% estimated ejection fraction.   2. Entire apex and mid and apical anterior septum are abnormal.    Cath:  Cardiac catheterization -  coronary 11/20/2023  CONCLUSIONS:   1. Left main: no significant angiographic disease.   2. LAD: 100% subtotal proximal NSTEMI culprit lesion, distal vesel fills via faint right to left collaterls.   3. LCx: no significant angiographic disease -- distal LCx became occluded from thromboembolism from proximal LAD during PCI.   4. RCA: no significant angiographic disease.   5. LVEDP 19mmhg, no aortic stenosis on LV-Ao gradient.   6. Successful PCI to proximal NSTEMI culprit lesion with one Synergy ANNIKA.   7. Successful PTCA to distal LCx/OM3 embolism that occurred during LAD PCI--0% residual stenosis and PRAKASH 3 flow.        Lab review: I have personally reviewed the laboratory result(s)   Diagnostic review: I have personally reviewed the result(s) of the Echocardiogram and [facility]     Assessment/Plan   Very pleasant 32 yo male from home with h/o hypertension presenting today for follow up s/p recent hospitalization for NSTEMI s/p PCI to pLAD 100% subtotally occluded culprit and PTCA to dLCx as it became occluded d/t thromboembolism from pLAD lesion during PCI.  EF 40-45%. Doing well from a cardiac standpoint.     Plan:  -Continue asa 81mg daily indefinitely  -Continue Brilinta 90mg BID for a minimum of 1 year post PCI  -Continue HI statin, metoprolol, and losartan  -Discussed cardiac rehab, but will defer d/t work schedule conflict  -Follow up in 6 months or sooner if needed       ROMAIN Garcia-CNP

## 2023-12-31 ENCOUNTER — HOSPITAL ENCOUNTER (OUTPATIENT)
Dept: CARDIOLOGY | Facility: HOSPITAL | Age: 33
Discharge: HOME | End: 2023-12-31
Payer: COMMERCIAL

## 2023-12-31 LAB
ATRIAL RATE: 75 BPM
P AXIS: 47 DEGREES
P OFFSET: 183 MS
P ONSET: 135 MS
PR INTERVAL: 160 MS
Q ONSET: 215 MS
QRS COUNT: 12 BEATS
QRS DURATION: 96 MS
QT INTERVAL: 386 MS
QTC CALCULATION(BAZETT): 431 MS
QTC FREDERICIA: 415 MS
R AXIS: -5 DEGREES
T AXIS: 60 DEGREES
T OFFSET: 408 MS
VENTRICULAR RATE: 75 BPM

## 2023-12-31 PROCEDURE — 93005 ELECTROCARDIOGRAM TRACING: CPT

## 2024-01-10 DIAGNOSIS — I21.4 NSTEMI (NON-ST ELEVATED MYOCARDIAL INFARCTION) (MULTI): ICD-10-CM

## 2024-01-10 RX ORDER — NAPROXEN SODIUM 220 MG/1
81 TABLET, FILM COATED ORAL DAILY
Qty: 30 TABLET | Refills: 0 | Status: SHIPPED | OUTPATIENT
Start: 2024-01-10 | End: 2024-02-11

## 2024-02-11 DIAGNOSIS — I21.4 NSTEMI (NON-ST ELEVATED MYOCARDIAL INFARCTION) (MULTI): ICD-10-CM

## 2024-02-11 RX ORDER — NAPROXEN SODIUM 220 MG/1
81 TABLET, FILM COATED ORAL DAILY
Qty: 30 TABLET | Refills: 0 | Status: SHIPPED | OUTPATIENT
Start: 2024-02-11 | End: 2024-03-11

## 2024-03-08 ENCOUNTER — HOSPITAL ENCOUNTER (OUTPATIENT)
Facility: HOSPITAL | Age: 34
Setting detail: OBSERVATION
Discharge: HOME | End: 2024-03-09
Attending: STUDENT IN AN ORGANIZED HEALTH CARE EDUCATION/TRAINING PROGRAM | Admitting: INTERNAL MEDICINE
Payer: COMMERCIAL

## 2024-03-08 DIAGNOSIS — T18.108A FOREIGN BODY IN ESOPHAGUS, INITIAL ENCOUNTER: Primary | ICD-10-CM

## 2024-03-08 DIAGNOSIS — T18.108A FB ESOPHAGUS, INITIAL ENCOUNTER: ICD-10-CM

## 2024-03-08 LAB
ALBUMIN SERPL BCP-MCNC: 4.3 G/DL (ref 3.4–5)
ANION GAP SERPL CALC-SCNC: 14 MMOL/L (ref 10–20)
BUN SERPL-MCNC: 18 MG/DL (ref 6–23)
CALCIUM SERPL-MCNC: 8.9 MG/DL (ref 8.6–10.3)
CHLORIDE SERPL-SCNC: 106 MMOL/L (ref 98–107)
CO2 SERPL-SCNC: 27 MMOL/L (ref 21–32)
CREAT SERPL-MCNC: 1.05 MG/DL (ref 0.5–1.3)
EGFRCR SERPLBLD CKD-EPI 2021: >90 ML/MIN/1.73M*2
ERYTHROCYTE [DISTWIDTH] IN BLOOD BY AUTOMATED COUNT: 11.9 % (ref 11.5–14.5)
GLUCOSE SERPL-MCNC: 85 MG/DL (ref 74–99)
HCT VFR BLD AUTO: 43.7 % (ref 41–52)
HGB BLD-MCNC: 14.8 G/DL (ref 13.5–17.5)
MCH RBC QN AUTO: 29.7 PG (ref 26–34)
MCHC RBC AUTO-ENTMCNC: 33.9 G/DL (ref 32–36)
MCV RBC AUTO: 88 FL (ref 80–100)
NRBC BLD-RTO: 0 /100 WBCS (ref 0–0)
PHOSPHATE SERPL-MCNC: 2.7 MG/DL (ref 2.5–4.9)
PLATELET # BLD AUTO: 296 X10*3/UL (ref 150–450)
POTASSIUM SERPL-SCNC: 3.9 MMOL/L (ref 3.5–5.3)
RBC # BLD AUTO: 4.99 X10*6/UL (ref 4.5–5.9)
SODIUM SERPL-SCNC: 143 MMOL/L (ref 136–145)
WBC # BLD AUTO: 17.4 X10*3/UL (ref 4.4–11.3)

## 2024-03-08 PROCEDURE — 80069 RENAL FUNCTION PANEL: CPT | Performed by: STUDENT IN AN ORGANIZED HEALTH CARE EDUCATION/TRAINING PROGRAM

## 2024-03-08 PROCEDURE — 36415 COLL VENOUS BLD VENIPUNCTURE: CPT | Performed by: STUDENT IN AN ORGANIZED HEALTH CARE EDUCATION/TRAINING PROGRAM

## 2024-03-08 PROCEDURE — 85027 COMPLETE CBC AUTOMATED: CPT | Performed by: STUDENT IN AN ORGANIZED HEALTH CARE EDUCATION/TRAINING PROGRAM

## 2024-03-08 PROCEDURE — 99223 1ST HOSP IP/OBS HIGH 75: CPT | Performed by: INTERNAL MEDICINE

## 2024-03-08 PROCEDURE — 99285 EMERGENCY DEPT VISIT HI MDM: CPT

## 2024-03-08 PROCEDURE — 2500000004 HC RX 250 GENERAL PHARMACY W/ HCPCS (ALT 636 FOR OP/ED): Mod: JZ | Performed by: STUDENT IN AN ORGANIZED HEALTH CARE EDUCATION/TRAINING PROGRAM

## 2024-03-08 PROCEDURE — G0378 HOSPITAL OBSERVATION PER HR: HCPCS

## 2024-03-08 PROCEDURE — 96372 THER/PROPH/DIAG INJ SC/IM: CPT

## 2024-03-08 PROCEDURE — 2500000005 HC RX 250 GENERAL PHARMACY W/O HCPCS: Performed by: STUDENT IN AN ORGANIZED HEALTH CARE EDUCATION/TRAINING PROGRAM

## 2024-03-08 RX ORDER — ENOXAPARIN SODIUM 100 MG/ML
40 INJECTION SUBCUTANEOUS EVERY 24 HOURS
Status: DISCONTINUED | OUTPATIENT
Start: 2024-03-09 | End: 2024-03-09 | Stop reason: HOSPADM

## 2024-03-08 RX ORDER — ACETAMINOPHEN 325 MG/1
650 TABLET ORAL EVERY 4 HOURS PRN
Status: DISCONTINUED | OUTPATIENT
Start: 2024-03-08 | End: 2024-03-09 | Stop reason: HOSPADM

## 2024-03-08 RX ORDER — DEXTROSE MONOHYDRATE, SODIUM CHLORIDE, AND POTASSIUM CHLORIDE 50; 1.49; 9 G/1000ML; G/1000ML; G/1000ML
100 INJECTION, SOLUTION INTRAVENOUS CONTINUOUS
Status: DISCONTINUED | OUTPATIENT
Start: 2024-03-09 | End: 2024-03-09 | Stop reason: HOSPADM

## 2024-03-08 RX ORDER — FAMOTIDINE 10 MG/ML
20 INJECTION INTRAVENOUS EVERY 12 HOURS SCHEDULED
Status: DISCONTINUED | OUTPATIENT
Start: 2024-03-09 | End: 2024-03-09 | Stop reason: HOSPADM

## 2024-03-08 RX ORDER — ONDANSETRON 4 MG/1
4 TABLET, ORALLY DISINTEGRATING ORAL ONCE
Status: COMPLETED | OUTPATIENT
Start: 2024-03-08 | End: 2024-03-08

## 2024-03-08 RX ORDER — ACETAMINOPHEN 650 MG/1
650 SUPPOSITORY RECTAL EVERY 4 HOURS PRN
Status: DISCONTINUED | OUTPATIENT
Start: 2024-03-08 | End: 2024-03-09 | Stop reason: HOSPADM

## 2024-03-08 RX ORDER — ACETAMINOPHEN 160 MG/5ML
650 SOLUTION ORAL EVERY 4 HOURS PRN
Status: DISCONTINUED | OUTPATIENT
Start: 2024-03-08 | End: 2024-03-09 | Stop reason: HOSPADM

## 2024-03-08 RX ORDER — ONDANSETRON 4 MG/1
4 TABLET, FILM COATED ORAL EVERY 8 HOURS PRN
Status: DISCONTINUED | OUTPATIENT
Start: 2024-03-08 | End: 2024-03-09 | Stop reason: HOSPADM

## 2024-03-08 RX ORDER — ONDANSETRON HYDROCHLORIDE 2 MG/ML
4 INJECTION, SOLUTION INTRAVENOUS EVERY 8 HOURS PRN
Status: DISCONTINUED | OUTPATIENT
Start: 2024-03-08 | End: 2024-03-09 | Stop reason: HOSPADM

## 2024-03-08 RX ADMIN — ONDANSETRON 4 MG: 4 TABLET, ORALLY DISINTEGRATING ORAL at 20:03

## 2024-03-08 RX ADMIN — GLUCAGON 1 MG: KIT at 20:03

## 2024-03-08 ASSESSMENT — PAIN SCALES - GENERAL
PAINLEVEL_OUTOF10: 0 - NO PAIN
PAINLEVEL_OUTOF10: 2

## 2024-03-08 ASSESSMENT — PAIN - FUNCTIONAL ASSESSMENT
PAIN_FUNCTIONAL_ASSESSMENT: 0-10
PAIN_FUNCTIONAL_ASSESSMENT: 0-10

## 2024-03-08 ASSESSMENT — PAIN DESCRIPTION - DESCRIPTORS: DESCRIPTORS: DISCOMFORT

## 2024-03-08 ASSESSMENT — COLUMBIA-SUICIDE SEVERITY RATING SCALE - C-SSRS
2. HAVE YOU ACTUALLY HAD ANY THOUGHTS OF KILLING YOURSELF?: NO
6. HAVE YOU EVER DONE ANYTHING, STARTED TO DO ANYTHING, OR PREPARED TO DO ANYTHING TO END YOUR LIFE?: NO
1. IN THE PAST MONTH, HAVE YOU WISHED YOU WERE DEAD OR WISHED YOU COULD GO TO SLEEP AND NOT WAKE UP?: NO

## 2024-03-09 ENCOUNTER — ANESTHESIA EVENT (OUTPATIENT)
Dept: OPERATING ROOM | Facility: HOSPITAL | Age: 34
End: 2024-03-09
Payer: COMMERCIAL

## 2024-03-09 ENCOUNTER — ANESTHESIA (OUTPATIENT)
Dept: OPERATING ROOM | Facility: HOSPITAL | Age: 34
End: 2024-03-09
Payer: COMMERCIAL

## 2024-03-09 ENCOUNTER — APPOINTMENT (OUTPATIENT)
Dept: OPERATING ROOM | Facility: HOSPITAL | Age: 34
End: 2024-03-09
Payer: COMMERCIAL

## 2024-03-09 VITALS
SYSTOLIC BLOOD PRESSURE: 137 MMHG | DIASTOLIC BLOOD PRESSURE: 90 MMHG | OXYGEN SATURATION: 95 % | TEMPERATURE: 96.4 F | HEART RATE: 81 BPM | HEIGHT: 70 IN | WEIGHT: 255 LBS | BODY MASS INDEX: 36.51 KG/M2 | RESPIRATION RATE: 18 BRPM

## 2024-03-09 DIAGNOSIS — K20.0 EOSINOPHILIC ESOPHAGITIS: Primary | ICD-10-CM

## 2024-03-09 PROBLEM — I25.10 CORONARY ARTERY DISEASE WITHOUT ANGINA PECTORIS: Status: ACTIVE | Noted: 2024-03-09

## 2024-03-09 PROBLEM — Z95.5 STENTED CORONARY ARTERY: Status: ACTIVE | Noted: 2024-03-09

## 2024-03-09 LAB
ANION GAP SERPL CALC-SCNC: 10 MMOL/L (ref 10–20)
BUN SERPL-MCNC: 18 MG/DL (ref 6–23)
CALCIUM SERPL-MCNC: 8.4 MG/DL (ref 8.6–10.3)
CHLORIDE SERPL-SCNC: 110 MMOL/L (ref 98–107)
CO2 SERPL-SCNC: 26 MMOL/L (ref 21–32)
CREAT SERPL-MCNC: 0.97 MG/DL (ref 0.5–1.3)
EGFRCR SERPLBLD CKD-EPI 2021: >90 ML/MIN/1.73M*2
ERYTHROCYTE [DISTWIDTH] IN BLOOD BY AUTOMATED COUNT: 12 % (ref 11.5–14.5)
GLUCOSE SERPL-MCNC: 99 MG/DL (ref 74–99)
HCT VFR BLD AUTO: 41.7 % (ref 41–52)
HGB BLD-MCNC: 13.8 G/DL (ref 13.5–17.5)
MCH RBC QN AUTO: 29.2 PG (ref 26–34)
MCHC RBC AUTO-ENTMCNC: 33.1 G/DL (ref 32–36)
MCV RBC AUTO: 88 FL (ref 80–100)
NRBC BLD-RTO: 0 /100 WBCS (ref 0–0)
PLATELET # BLD AUTO: 270 X10*3/UL (ref 150–450)
POTASSIUM SERPL-SCNC: 4 MMOL/L (ref 3.5–5.3)
RBC # BLD AUTO: 4.72 X10*6/UL (ref 4.5–5.9)
SODIUM SERPL-SCNC: 142 MMOL/L (ref 136–145)
WBC # BLD AUTO: 10 X10*3/UL (ref 4.4–11.3)

## 2024-03-09 PROCEDURE — 36415 COLL VENOUS BLD VENIPUNCTURE: CPT | Performed by: INTERNAL MEDICINE

## 2024-03-09 PROCEDURE — 2500000005 HC RX 250 GENERAL PHARMACY W/O HCPCS: Performed by: ANESTHESIOLOGY

## 2024-03-09 PROCEDURE — G0378 HOSPITAL OBSERVATION PER HR: HCPCS

## 2024-03-09 PROCEDURE — 7100000001 HC RECOVERY ROOM TIME - INITIAL BASE CHARGE: Performed by: ANESTHESIOLOGY

## 2024-03-09 PROCEDURE — 3700000002 HC GENERAL ANESTHESIA TIME - EACH INCREMENTAL 1 MINUTE: Performed by: ANESTHESIOLOGY

## 2024-03-09 PROCEDURE — 3700000001 HC GENERAL ANESTHESIA TIME - INITIAL BASE CHARGE: Performed by: ANESTHESIOLOGY

## 2024-03-09 PROCEDURE — 99239 HOSP IP/OBS DSCHRG MGMT >30: CPT | Performed by: STUDENT IN AN ORGANIZED HEALTH CARE EDUCATION/TRAINING PROGRAM

## 2024-03-09 PROCEDURE — 7100000002 HC RECOVERY ROOM TIME - EACH INCREMENTAL 1 MINUTE: Performed by: ANESTHESIOLOGY

## 2024-03-09 PROCEDURE — 88305 TISSUE EXAM BY PATHOLOGIST: CPT | Mod: TC,GEALAB | Performed by: INTERNAL MEDICINE

## 2024-03-09 PROCEDURE — 43239 EGD BIOPSY SINGLE/MULTIPLE: CPT | Performed by: INTERNAL MEDICINE

## 2024-03-09 PROCEDURE — 80048 BASIC METABOLIC PNL TOTAL CA: CPT | Performed by: INTERNAL MEDICINE

## 2024-03-09 PROCEDURE — 3600000007 HC OR TIME - EACH INCREMENTAL 1 MINUTE - PROCEDURE LEVEL TWO: Performed by: ANESTHESIOLOGY

## 2024-03-09 PROCEDURE — 2500000004 HC RX 250 GENERAL PHARMACY W/ HCPCS (ALT 636 FOR OP/ED): Performed by: INTERNAL MEDICINE

## 2024-03-09 PROCEDURE — 88305 TISSUE EXAM BY PATHOLOGIST: CPT | Performed by: PATHOLOGY

## 2024-03-09 PROCEDURE — 96374 THER/PROPH/DIAG INJ IV PUSH: CPT | Mod: 59

## 2024-03-09 PROCEDURE — 3600000002 HC OR TIME - INITIAL BASE CHARGE - PROCEDURE LEVEL TWO: Performed by: ANESTHESIOLOGY

## 2024-03-09 PROCEDURE — 85027 COMPLETE CBC AUTOMATED: CPT | Performed by: INTERNAL MEDICINE

## 2024-03-09 PROCEDURE — 96372 THER/PROPH/DIAG INJ SC/IM: CPT | Performed by: INTERNAL MEDICINE

## 2024-03-09 PROCEDURE — 2500000004 HC RX 250 GENERAL PHARMACY W/ HCPCS (ALT 636 FOR OP/ED): Performed by: ANESTHESIOLOGY

## 2024-03-09 RX ORDER — PROPOFOL 10 MG/ML
INJECTION, EMULSION INTRAVENOUS AS NEEDED
Status: DISCONTINUED | OUTPATIENT
Start: 2024-03-09 | End: 2024-03-09

## 2024-03-09 RX ORDER — MIDAZOLAM HYDROCHLORIDE 1 MG/ML
INJECTION, SOLUTION INTRAMUSCULAR; INTRAVENOUS AS NEEDED
Status: DISCONTINUED | OUTPATIENT
Start: 2024-03-09 | End: 2024-03-09

## 2024-03-09 RX ORDER — FENTANYL CITRATE 50 UG/ML
INJECTION, SOLUTION INTRAMUSCULAR; INTRAVENOUS AS NEEDED
Status: DISCONTINUED | OUTPATIENT
Start: 2024-03-09 | End: 2024-03-09

## 2024-03-09 RX ORDER — OMEPRAZOLE 40 MG/1
40 CAPSULE, DELAYED RELEASE ORAL
Qty: 60 CAPSULE | Refills: 1 | Status: SHIPPED | OUTPATIENT
Start: 2024-03-09 | End: 2025-03-09

## 2024-03-09 RX ORDER — MIDAZOLAM HYDROCHLORIDE 1 MG/ML
INJECTION INTRAMUSCULAR; INTRAVENOUS AS NEEDED
Status: DISCONTINUED | OUTPATIENT
Start: 2024-03-09 | End: 2024-03-09

## 2024-03-09 RX ORDER — LIDOCAINE HCL/PF 100 MG/5ML
SYRINGE (ML) INTRAVENOUS AS NEEDED
Status: DISCONTINUED | OUTPATIENT
Start: 2024-03-09 | End: 2024-03-09

## 2024-03-09 RX ADMIN — MIDAZOLAM HYDROCHLORIDE 2 MG: 1 INJECTION, SOLUTION INTRAMUSCULAR; INTRAVENOUS at 08:46

## 2024-03-09 RX ADMIN — SODIUM CHLORIDE, SODIUM LACTATE, POTASSIUM CHLORIDE, AND CALCIUM CHLORIDE: 600; 310; 30; 20 INJECTION, SOLUTION INTRAVENOUS at 08:39

## 2024-03-09 RX ADMIN — PROPOFOL 50 MG: 10 INJECTION, EMULSION INTRAVENOUS at 08:45

## 2024-03-09 RX ADMIN — POTASSIUM CHLORIDE, DEXTROSE MONOHYDRATE AND SODIUM CHLORIDE 100 ML/HR: 150; 5; 900 INJECTION, SOLUTION INTRAVENOUS at 00:26

## 2024-03-09 RX ADMIN — PROPOFOL 30 MG: 10 INJECTION, EMULSION INTRAVENOUS at 08:47

## 2024-03-09 RX ADMIN — FAMOTIDINE 20 MG: 10 INJECTION, SOLUTION INTRAVENOUS at 00:26

## 2024-03-09 RX ADMIN — FENTANYL CITRATE 25 MCG: 50 INJECTION, SOLUTION INTRAMUSCULAR; INTRAVENOUS at 09:00

## 2024-03-09 RX ADMIN — ENOXAPARIN SODIUM 40 MG: 40 INJECTION SUBCUTANEOUS at 00:26

## 2024-03-09 RX ADMIN — LIDOCAINE HYDROCHLORIDE 50 MG: 20 INJECTION INTRAVENOUS at 09:00

## 2024-03-09 SDOH — SOCIAL STABILITY: SOCIAL INSECURITY: DOES ANYONE TRY TO KEEP YOU FROM HAVING/CONTACTING OTHER FRIENDS OR DOING THINGS OUTSIDE YOUR HOME?: NO

## 2024-03-09 SDOH — SOCIAL STABILITY: SOCIAL INSECURITY: ABUSE: ADULT

## 2024-03-09 SDOH — SOCIAL STABILITY: SOCIAL INSECURITY: HAVE YOU HAD THOUGHTS OF HARMING ANYONE ELSE?: NO

## 2024-03-09 SDOH — SOCIAL STABILITY: SOCIAL INSECURITY: HAS ANYONE EVER THREATENED TO HURT YOUR FAMILY OR YOUR PETS?: NO

## 2024-03-09 SDOH — SOCIAL STABILITY: SOCIAL INSECURITY: ARE THERE ANY APPARENT SIGNS OF INJURIES/BEHAVIORS THAT COULD BE RELATED TO ABUSE/NEGLECT?: NO

## 2024-03-09 SDOH — HEALTH STABILITY: MENTAL HEALTH: CURRENT SMOKER: 0

## 2024-03-09 SDOH — SOCIAL STABILITY: SOCIAL INSECURITY: DO YOU FEEL UNSAFE GOING BACK TO THE PLACE WHERE YOU ARE LIVING?: NO

## 2024-03-09 SDOH — SOCIAL STABILITY: SOCIAL INSECURITY: WERE YOU ABLE TO COMPLETE ALL THE BEHAVIORAL HEALTH SCREENINGS?: YES

## 2024-03-09 SDOH — SOCIAL STABILITY: SOCIAL INSECURITY: ARE YOU OR HAVE YOU BEEN THREATENED OR ABUSED PHYSICALLY, EMOTIONALLY, OR SEXUALLY BY ANYONE?: NO

## 2024-03-09 SDOH — SOCIAL STABILITY: SOCIAL INSECURITY: DO YOU FEEL ANYONE HAS EXPLOITED OR TAKEN ADVANTAGE OF YOU FINANCIALLY OR OF YOUR PERSONAL PROPERTY?: NO

## 2024-03-09 ASSESSMENT — COGNITIVE AND FUNCTIONAL STATUS - GENERAL
PATIENT BASELINE BEDBOUND: NO
DAILY ACTIVITIY SCORE: 24
DAILY ACTIVITIY SCORE: 24
MOBILITY SCORE: 24
MOBILITY SCORE: 24

## 2024-03-09 ASSESSMENT — ACTIVITIES OF DAILY LIVING (ADL)
DRESSING YOURSELF: INDEPENDENT
WALKS IN HOME: INDEPENDENT
JUDGMENT_ADEQUATE_SAFELY_COMPLETE_DAILY_ACTIVITIES: YES
PATIENT'S MEMORY ADEQUATE TO SAFELY COMPLETE DAILY ACTIVITIES?: YES
HEARING - RIGHT EAR: FUNCTIONAL
WALKS IN HOME: INDEPENDENT
PATIENT'S MEMORY ADEQUATE TO SAFELY COMPLETE DAILY ACTIVITIES?: YES
FEEDING YOURSELF: INDEPENDENT
FEEDING YOURSELF: INDEPENDENT
ADEQUATE_TO_COMPLETE_ADL: YES
HEARING - LEFT EAR: FUNCTIONAL
ADEQUATE_TO_COMPLETE_ADL: YES
BATHING: INDEPENDENT
BATHING: INDEPENDENT
HEARING - RIGHT EAR: FUNCTIONAL
HEARING - LEFT EAR: FUNCTIONAL
TOILETING: INDEPENDENT
JUDGMENT_ADEQUATE_SAFELY_COMPLETE_DAILY_ACTIVITIES: YES
TOILETING: INDEPENDENT
GROOMING: INDEPENDENT
LACK_OF_TRANSPORTATION: NO
LACK_OF_TRANSPORTATION: NO
DRESSING YOURSELF: INDEPENDENT
GROOMING: INDEPENDENT

## 2024-03-09 ASSESSMENT — PATIENT HEALTH QUESTIONNAIRE - PHQ9
1. LITTLE INTEREST OR PLEASURE IN DOING THINGS: NOT AT ALL
SUM OF ALL RESPONSES TO PHQ9 QUESTIONS 1 & 2: 0
2. FEELING DOWN, DEPRESSED OR HOPELESS: NOT AT ALL

## 2024-03-09 ASSESSMENT — ENCOUNTER SYMPTOMS
NEUROLOGICAL NEGATIVE: 1
CONSTITUTIONAL NEGATIVE: 1
EYES NEGATIVE: 1
GASTROINTESTINAL NEGATIVE: 1
CARDIOVASCULAR NEGATIVE: 1
RESPIRATORY NEGATIVE: 1
ENDOCRINE NEGATIVE: 1
PSYCHIATRIC NEGATIVE: 1
MUSCULOSKELETAL NEGATIVE: 1
HEMATOLOGIC/LYMPHATIC NEGATIVE: 1
ALLERGIC/IMMUNOLOGIC NEGATIVE: 1

## 2024-03-09 ASSESSMENT — LIFESTYLE VARIABLES
SKIP TO QUESTIONS 9-10: 1
AUDIT-C TOTAL SCORE: 0
HOW OFTEN DO YOU HAVE 6 OR MORE DRINKS ON ONE OCCASION: NEVER
HOW MANY STANDARD DRINKS CONTAINING ALCOHOL DO YOU HAVE ON A TYPICAL DAY: PATIENT DOES NOT DRINK
HOW OFTEN DO YOU HAVE A DRINK CONTAINING ALCOHOL: NEVER
AUDIT-C TOTAL SCORE: 0

## 2024-03-09 ASSESSMENT — PAIN SCALES - GENERAL
PAINLEVEL_OUTOF10: 0 - NO PAIN
PAINLEVEL_OUTOF10: 0 - NO PAIN
PAIN_LEVEL: 2

## 2024-03-09 ASSESSMENT — PAIN - FUNCTIONAL ASSESSMENT: PAIN_FUNCTIONAL_ASSESSMENT: 0-10

## 2024-03-09 NOTE — ED PROVIDER NOTES
CC: Swallowed Foreign Body (Pt was eating chicken breast when he states it feels like it is stuck in her throat )     HPI:  Patient is a 33-year-old male presenting to the ED with foreign body sensation.  Patient states prior to arrival he was at work when he swallowed a piece of chicken and felt like it got stuck in his throat.  Denies any choking or shortness of breath.  Has been able to drink water but sensation is still there.  Denies any abdominal pain nausea vomiting or diarrhea.  No respiratory distress or shortness of breath. No hx esophageal stricture, reflux or dysphagia.      Limitations to History: None    Records Reviewed:  Recent available ED and inpatient notes reviewed in EMR.    PMHx/PSHx:  Per HPI.   - has a past medical history of Hypertension.  - has a past surgical history that includes Abdominal surgery and Cardiac catheterization (N/A, 11/20/2023).    Medications:  Reviewed in EMR. See EMR for complete list of medications and doses.    Allergies:  Patient has no known allergies.    Social History:  - Tobacco:  reports that he has never smoked. He has quit using smokeless tobacco.  His smokeless tobacco use included chew.   - Alcohol:  reports that he does not currently use alcohol.   - Illicit Drugs:  reports no history of drug use.     ROS:  Per HPI.     ???????????????????????????????????????????????????????????????  Triage Vitals:  T 36 °C (96.8 °F)  HR 74  /89  RR 16  O2 98 % None (Room air)    PHYSICAL EXAM:   VS: As documented in the triage note and EMR flowsheet from this visit were reviewed.  Gen: Well developed.  Appears comfortable.  Eyes: pupils equally round, Clear scerla.  HENT: NC/AT, Mucosal membranes moist.   Neck: Supple, tracheal midline  Resp: Non-labored breathing on RA, no stridor  CV: regular rate, extremities well perfused  Abd: Soft, non-distended, non-tender  Skin: WWP. No systemic rashes or lesions.  MSK: normal muscle bulk, no obvious joint swelling in  extremities  Neuro:  AAOx3, speech fluent, MAEx4, no facial droop  Psych: Appropriate mood and affect  ???????????????????????????????????????????????????????????????    ED Labs/Imaging:   Labs Reviewed   CBC   RENAL FUNCTION PANEL     No orders to display         ED Course & MDM   ED Course as of 03/08/24 2128   Fri Mar 08, 2024   2018 This is a 33-year-old male presenting with chief complaint of possible foreign body in esophagus.  Patient prior to arrival had eaten chicken and feels like it stuck.  Tolerating p.o. at this time and airway patent.  And excretions well.  Plan be to initially try glucagon and was given Zofran as needed for nausea.  If does not work will consult GI for possible endoscopy. [WL]      ED Course User Index  [WL] Gurinder Breewr, DO         Diagnoses as of 03/08/24 2128   Foreign body in esophagus, initial encounter           Medical Decision Making  This is a 33-year-old male presenting to the ED with foreign body sensation after eating a piece of chicken today.  No choking or airway compromise lungs clear.  Patient is able to tolerate water and ginger ale but has significant foreign body sensation still concerning for esophageal foreign body.  Patient was given ginger ale as well as glucagon without relief in his symptoms.  Patient was discussed with gastroenterology who will perform EGD.  Initially OR/endoscopy availability for EGD tonight however due to emergent cases will not be able to scope till the morning.  Will be admitted for EGD and further management.  Patient agreeable to plan was admitted in stable condition.      Social Determinants Limiting Care:  None identified    Disposition:  As a result of their workup, the patient will require admission to the hospital.  The patient was informed of their diagnosis.  Patient was given the opportunity to ask questions and answered them.  Patient agreed to be admitted to the hospital.    Patient seen and discussed with attending  physician.    Shefali Hurley MD PGY3  Emergency Medicine      Procedures ? [employer] last updated 3/8/2024 9:28 PM        Shefali Hurley MD  Resident  03/08/24 1692

## 2024-03-09 NOTE — CARE PLAN
The patient's goals for the shift include sleep and get thick chicken out my throat tomorrow    The clinical goals for the shift include surgery

## 2024-03-09 NOTE — CARE PLAN
Pt admitted this shift for foreign body stuck in esophagus. Pt has had no c/o nausea, SOB, or difficulty breathing this shift. He is tolerating his secretions well. Pt has been resting comfortably. Minimal c/o pain this shift. Pt NPO for OR this morning. Pt continues to progress towards meeting goals. VSS. Will continue to monitor.

## 2024-03-09 NOTE — H&P
History Of Present Illness  Narciso Coates is a 33 y.o. male with history of hypertension, NSTEMI, and CAD s/p PCI with stenting of LAD presenting with FB sensation in the esophagus. He says he was eating chicken at around 6:30 PM tonight when he coughed and a piece of the food inadvertently went down his gullet and got stuck there. He says he has been spitting quite a bit. He drove himself to the ED. ED staff gave him glucagon without effect.      Past Medical History  Past Medical History:   Diagnosis Date    Hypertension    NSTEMI  CAD s/p PCI with stenting of the LAD  GERD  Nephrolithiasis  Hperlipidemia    Past Surgical History  Past Surgical History:   Procedure Laterality Date    ABDOMINAL SURGERY      gallbladder removal    CARDIAC CATHETERIZATION N/A 11/20/2023    Procedure: Left Heart Cath;  Surgeon: Eliud Bartholomew MD;  Location: [facility] Cardiac Cath Lab;  Service: Cardiovascular;  Laterality: N/A;   Laparoscopic cholecystectomy     Social History  He reports that he has never smoked. He has quit using smokeless tobacco.  His smokeless tobacco use included chew. He reports that he does not currently use alcohol. He reports that he does not use drugs.    Family History  Mother had metastatic cancer     Allergies  Patient has no known allergies.    Review of Systems   Constitutional: Negative.    HENT:          See HPI   Eyes: Negative.    Respiratory: Negative.     Cardiovascular: Negative.    Gastrointestinal: Negative.    Endocrine: Negative.    Genitourinary: Negative.    Musculoskeletal: Negative.    Skin: Negative.    Allergic/Immunologic: Negative.    Neurological: Negative.    Hematological: Negative.    Psychiatric/Behavioral: Negative.          Physical Exam  Constitutional:       General: He is not in acute distress.     Appearance: He is obese. He is not ill-appearing, toxic-appearing or diaphoretic.   HENT:      Head: Normocephalic and atraumatic.      Nose: Nose normal.      Mouth/Throat:       "Mouth: Mucous membranes are moist.      Pharynx: Oropharynx is clear. No oropharyngeal exudate or posterior oropharyngeal erythema.   Eyes:      General: No scleral icterus.        Right eye: No discharge.         Left eye: No discharge.      Conjunctiva/sclera: Conjunctivae normal.   Cardiovascular:      Rate and Rhythm: Normal rate and regular rhythm.      Heart sounds: Normal heart sounds. No murmur heard.  Pulmonary:      Effort: No respiratory distress.      Breath sounds: Normal breath sounds. No wheezing, rhonchi or rales.   Abdominal:      Palpations: Abdomen is soft.      Tenderness: There is no abdominal tenderness. There is no right CVA tenderness, left CVA tenderness, guarding or rebound.      Hernia: No hernia is present.   Musculoskeletal:      Cervical back: Neck supple.      Right lower leg: No edema.      Left lower leg: No edema.   Lymphadenopathy:      Cervical: No cervical adenopathy.   Skin:     General: Skin is warm and dry.      Findings: No lesion or rash.   Neurological:      General: No focal deficit present.      Mental Status: He is alert and oriented to person, place, and time.   Psychiatric:         Mood and Affect: Mood normal.         Behavior: Behavior normal.          Last Recorded Vitals  Blood pressure 154/89, pulse 74, temperature 36 °C (96.8 °F), resp. rate 16, height 1.778 m (5' 10\"), weight 116 kg (255 lb), SpO2 98 %.    Relevant Results   Latest Reference Range & Units 03/08/24 21:27   GLUCOSE 74 - 99 mg/dL 85   SODIUM 136 - 145 mmol/L 143   POTASSIUM 3.5 - 5.3 mmol/L 3.9   CHLORIDE 98 - 107 mmol/L 106   Bicarbonate 21 - 32 mmol/L 27   Anion Gap 10 - 20 mmol/L 14   Blood Urea Nitrogen 6 - 23 mg/dL 18   Creatinine 0.50 - 1.30 mg/dL 1.05   EGFR >60 mL/min/1.73m*2 >90   Calcium 8.6 - 10.3 mg/dL 8.9   PHOSPHORUS 2.5 - 4.9 mg/dL 2.7   Albumin 3.4 - 5.0 g/dL 4.3   WBC 4.4 - 11.3 x10*3/uL 17.4 (H)   nRBC 0.0 - 0.0 /100 WBCs 0.0   RBC 4.50 - 5.90 x10*6/uL 4.99   HEMOGLOBIN 13.5 - " 17.5 g/dL 14.8   HEMATOCRIT 41.0 - 52.0 % 43.7   MCV 80 - 100 fL 88   MCH 26.0 - 34.0 pg 29.7   MCHC 32.0 - 36.0 g/dL 33.9   RED CELL DISTRIBUTION WIDTH 11.5 - 14.5 % 11.9   Platelets 150 - 450 x10*3/uL 296   (H): Data is abnormally high     Assessment/Plan   FB esophagus, initial encounter  Piece of chicken stuck in esophagus  He is able to keep some secretions down and there is no airway compromise  Plan is to have EGD with removal of FB in the am, and for that GI has been consulted  Supportive care in the meantime    Leucocytosis  WBC 17.4  Feel this is reactive and not indicative of active infection  Will recheck level in am    History of recent NSTEMI and CAD s/p PCI with stenting of LAD  Clinically stable  Will resume his DAPT, BB and HI statin tomorrow after removal of esophageal FB    HTN  To resume home BP med in am after removal of esophageal FB  Will use IV labetalol as needed for elevated BP readings      I spent 60 minutes in the professional and overall care of this patient.      Melissa Cornejo MD

## 2024-03-09 NOTE — PROGRESS NOTES
03/09/24 0922   Discharge Planning   Living Arrangements Spouse/significant other   Support Systems Spouse/significant other   Assistance Needed alert x3, independent   Type of Residence Private residence   Number of Stairs to Enter Residence 4   Number of Stairs Within Residence 14   Do you have animals or pets at home? Yes   Who is requesting discharge planning? Provider   Home or Post Acute Services None   Patient expects to be discharged to: home no needs   Does the patient need discharge transport arranged? No   Financial Resource Strain   How hard is it for you to pay for the very basics like food, housing, medical care, and heating? Not hard   Housing Stability   In the last 12 months, was there a time when you were not able to pay the mortgage or rent on time? N   In the last 12 months, how many places have you lived? 1   In the last 12 months, was there a time when you did not have a steady place to sleep or slept in a shelter (including now)? N   Transportation Needs   In the past 12 months, has lack of transportation kept you from medical appointments or from getting medications? no   In the past 12 months, has lack of transportation kept you from meetings, work, or from getting things needed for daily living? No

## 2024-03-09 NOTE — DISCHARGE INSTRUCTIONS

## 2024-03-09 NOTE — DISCHARGE SUMMARY
Discharge Diagnosis  FB esophagus, initial encounter    Issues Requiring Follow-Up  Post hospital follow up  Biopsies results pending    Discharge Meds     Your medication list        START taking these medications        Instructions Last Dose Given Next Dose Due   omeprazole 40 mg DR capsule  Commonly known as: PriLOSEC      Take 1 capsule (40 mg) by mouth 2 times a day before meals. Do not crush or chew.              CONTINUE taking these medications        Instructions Last Dose Given Next Dose Due   aspirin 81 mg chewable tablet      Chew 1 tablet (81 mg) once daily.       atorvastatin 80 mg tablet  Commonly known as: Lipitor      Take 1 tablet (80 mg) by mouth once daily at bedtime.       losartan 25 mg tablet  Commonly known as: Cozaar      Take 1 tablet (25 mg) by mouth once daily. Do not start before November 22, 2023.       metoprolol tartrate 25 mg tablet  Commonly known as: Lopressor      Take 1 tablet (25 mg) by mouth 2 times a day.       ticagrelor 90 mg tablet  Commonly known as: Brilinta      Take 1 tablet (90 mg) by mouth 2 times a day.                 Where to Get Your Medications        These medications were sent to [id number] [address] [address] [address]      Phone: [phone]   omeprazole 40 mg DR capsule         Test Results Pending At Discharge  Pending Labs       Order Current Status    Surgical Pathology Exam Collected (03/09/24 5366)            Hospital Course   Narciso Coates is a 33 y.o. male with history of hypertension, NSTEMI, and CAD s/p PCI with stenting of LAD presenting with FB sensation in the esophagus. He says he was eating chicken at around 6:30 PM PTA when he coughed and a piece of the food inadvertently went down his gullet and got stuck there. GI was consulted. He underwent EGD with food bolus removed. He has eosinophilic esophagitis with stricture. Biopsies were taken and results pending. Post procedure,  pt did well. He is hemodynamically stable for discharge at this time with close outpatient follow ups.     The patient was discharged in satisfactory condition.   Medications and side effect profile reviewed with patient.  More than 30 minutes were spent in coordinating patient discharge       Pertinent Physical Exam At Time of Discharge  Physical Exam  Constitutional: Awake, alert, NAD.  Eyes: PERRLA, EOMI. No erythema or exudate. No proptosis or lid lag.   ENMT: MMM, no nasal congestion, no oral lesions, oropharynx clear without tonsillar erythema or exudate.  Head/Neck: NCAT, neck supple. Full active ROM of the neck. No thyromegaly or mass. No JVP.  Respiratory/Thorax: CTAB, no increased work of breathing, no increased respiratory effort, no wheeze, rales, or rhonchi.  Cardiovascular: Regular rate and rhythm, normal S1 and S2, no murmurs, rubs, or gallops.  Gastrointestinal: Soft, nontender to palpation, nondistended, no guarding or rebound, normoactive bowel sounds  Musculoskeletal: Strength 5/5. MAEx4. No muscle wasting.  Extremities: 2+ RPs, no cyanosis or edema  Neurological: CN II-XII intact. Normal gait. No gross deficits.  Lymphatic: No lymphadenopathy.  Skin: Warm and well perfused.     Outpatient Follow-Up  Future Appointments   Date Time Provider Department Center   5/9/2024  2:00 PM ROMAIN Garcia-CNP [id number]         Eli Tomlinson MD  Hospitalist

## 2024-03-09 NOTE — ANESTHESIA POSTPROCEDURE EVALUATION
Patient: Narciso Coates    Procedure Summary       Date: 03/09/24 Room / Location: [facility] OR    Anesthesia Start: 0839 Anesthesia Stop: 0905    Procedure: EGD Diagnosis:       Foreign body in esophagus, initial encounter      FB esophagus, initial encounter    Scheduled Providers:  Responsible Provider: Karan Rios MD    Anesthesia Type: MAC ASA Status: 3            Anesthesia Type: No value filed.    Vitals Value Taken Time   /99 03/09/24 0859   Temp 36.5 °C (97.7 °F) 03/09/24 0859   Pulse 89 03/09/24 0859   Resp 15 03/09/24 0859   SpO2 95 % 03/09/24 0859       Anesthesia Post Evaluation    Patient location during evaluation: bedside  Patient participation: complete - patient participated  Level of consciousness: awake  Pain score: 2  Pain management: adequate  Airway patency: patent  Cardiovascular status: acceptable  Respiratory status: acceptable  Hydration status: acceptable  Postoperative Nausea and Vomiting: none        No notable events documented.

## 2024-03-09 NOTE — ANESTHESIA PREPROCEDURE EVALUATION
Patient: Narciso Coates    Procedure Information       Date/Time: 03/09/24 0805    Procedure: EGD    Location: [facility] OR            Relevant Problems   Anesthesia (within normal limits)      Cardiovascular   (+) Coronary artery disease without angina pectoris   (+) Stented coronary artery       Clinical information reviewed:   Tobacco  Allergies  Meds  Problems  Med Hx  Surg Hx   Fam Hx  Soc   Hx        NPO Detail:  NPO/Void Status  Date of Last Liquid: 03/08/24  Date of Last Solid: 03/08/24         Physical Exam    Airway  Mallampati: I  TM distance: >3 FB  Neck ROM: full     Cardiovascular   Rate: normal     Dental - normal exam     Pulmonary   Breath sounds clear to auscultation     Abdominal   (+) obese  Abdomen: soft  Bowel sounds: normal           Anesthesia Plan    History of general anesthesia?: yes  History of complications of general anesthesia?: no    ASA 3     MAC     The patient is not a current smoker.  Patient was previously instructed to abstain from smoking on day of procedure.  Patient did not smoke on day of procedure.    Anesthetic plan and risks discussed with patient.  Use of blood products discussed with patient who consented to blood products.

## 2024-03-10 DIAGNOSIS — I21.4 NSTEMI (NON-ST ELEVATED MYOCARDIAL INFARCTION) (MULTI): ICD-10-CM

## 2024-03-11 ENCOUNTER — TELEPHONE (OUTPATIENT)
Dept: GASTROENTEROLOGY | Facility: CLINIC | Age: 34
End: 2024-03-11
Payer: COMMERCIAL

## 2024-03-11 RX ORDER — NAPROXEN SODIUM 220 MG/1
81 TABLET, FILM COATED ORAL DAILY
Qty: 30 TABLET | Refills: 0 | Status: SHIPPED | OUTPATIENT
Start: 2024-03-11 | End: 2024-04-09 | Stop reason: SDUPTHER

## 2024-03-11 NOTE — TELEPHONE ENCOUNTER
----- Message from Therese Robins RN sent at 3/11/2024  8:29 AM EDT -----    ----- Message -----  From: Aquiles Hair MD  Sent: 3/9/2024   9:22 AM EDT  To: Therese Robins RN    Follow up in clinic in 1st available   MM

## 2024-03-13 ENCOUNTER — TELEPHONE (OUTPATIENT)
Dept: GASTROENTEROLOGY | Facility: CLINIC | Age: 34
End: 2024-03-13
Payer: COMMERCIAL

## 2024-03-15 LAB
LABORATORY COMMENT REPORT: NORMAL
PATH REPORT.FINAL DX SPEC: NORMAL
PATH REPORT.GROSS SPEC: NORMAL
PATH REPORT.RELEVANT HX SPEC: NORMAL
PATH REPORT.TOTAL CANCER: NORMAL

## 2024-04-09 DIAGNOSIS — I21.4 NSTEMI (NON-ST ELEVATED MYOCARDIAL INFARCTION) (MULTI): ICD-10-CM

## 2024-04-09 RX ORDER — NAPROXEN SODIUM 220 MG/1
81 TABLET, FILM COATED ORAL DAILY
Qty: 30 TABLET | Refills: 0 | Status: SHIPPED | OUTPATIENT
Start: 2024-04-09 | End: 2024-05-09 | Stop reason: SDUPTHER

## 2024-05-09 ENCOUNTER — OFFICE VISIT (OUTPATIENT)
Dept: CARDIOLOGY | Facility: HOSPITAL | Age: 34
End: 2024-05-09
Payer: COMMERCIAL

## 2024-05-09 VITALS
WEIGHT: 263.23 LBS | SYSTOLIC BLOOD PRESSURE: 148 MMHG | OXYGEN SATURATION: 98 % | DIASTOLIC BLOOD PRESSURE: 72 MMHG | BODY MASS INDEX: 37.77 KG/M2 | HEART RATE: 68 BPM

## 2024-05-09 DIAGNOSIS — I21.4 NSTEMI (NON-ST ELEVATED MYOCARDIAL INFARCTION) (MULTI): ICD-10-CM

## 2024-05-09 DIAGNOSIS — I10 ESSENTIAL HYPERTENSION: ICD-10-CM

## 2024-05-09 PROCEDURE — 1036F TOBACCO NON-USER: CPT | Performed by: NURSE PRACTITIONER

## 2024-05-09 PROCEDURE — 93005 ELECTROCARDIOGRAM TRACING: CPT | Performed by: NURSE PRACTITIONER

## 2024-05-09 PROCEDURE — 99214 OFFICE O/P EST MOD 30 MIN: CPT | Performed by: NURSE PRACTITIONER

## 2024-05-09 PROCEDURE — 3078F DIAST BP <80 MM HG: CPT | Performed by: NURSE PRACTITIONER

## 2024-05-09 PROCEDURE — 3077F SYST BP >= 140 MM HG: CPT | Performed by: NURSE PRACTITIONER

## 2024-05-09 RX ORDER — NAPROXEN SODIUM 220 MG/1
81 TABLET, FILM COATED ORAL DAILY
Qty: 30 TABLET | Refills: 0 | OUTPATIENT
Start: 2024-05-09

## 2024-05-09 RX ORDER — NAPROXEN SODIUM 220 MG/1
81 TABLET, FILM COATED ORAL DAILY
Qty: 90 TABLET | Refills: 3 | Status: SHIPPED | OUTPATIENT
Start: 2024-05-09 | End: 2025-05-09

## 2024-05-09 RX ORDER — LOSARTAN POTASSIUM 50 MG/1
50 TABLET ORAL DAILY
Qty: 90 TABLET | Refills: 3 | Status: SHIPPED | OUTPATIENT
Start: 2024-05-09 | End: 2025-05-04

## 2024-05-09 NOTE — PROGRESS NOTES
Chief Complaint:   6 month follow up      History Of Present Illness:    Narciso Coates is a 33 y.o. male from home with h/o NSTEMI s/p coronary angiography 11/20/2023 found to have 100% subtotally occluded proximal LAD (faint R-L collaterals) s/p ANNIKA as well as PTCA to distal Lcx as it became occluded from thromboembolism from prox LAD during PCI. EF 40-45% with apical hypokinesis, htn, hld presenting today for follow up.  Patient is doing very well from a cardiac standpoint.  Had an isolated event of sharp/stabbing chest pain in the upper right and then left chest near his axillary.  Pain lasted for a few minutes, was unrelated to exertion, and was different in nature than his anginal symptoms.  Denies feeling SOB, palpitations.       Last Recorded Vitals:  Vitals:    05/09/24 1343 05/09/24 1417   BP: (!) 150/98 148/72   Pulse: 68    SpO2: 98%    Weight: 119 kg (263 lb 3.7 oz)        Past Medical History:  He has a past medical history of Hypertension.    Past Surgical History:  He has a past surgical history that includes Abdominal surgery and Cardiac catheterization (N/A, 11/20/2023).      Social History:  He reports that he has never smoked. He has quit using smokeless tobacco.  His smokeless tobacco use included chew. He reports that he does not currently use alcohol. He reports that he does not use drugs.    Family History:  No family history on file.     Allergies:  Patient has no known allergies.    Outpatient Medications:  Current Outpatient Medications   Medication Instructions    aspirin 81 mg, oral, Daily    atorvastatin (LIPITOR) 80 mg, oral, Nightly    losartan (COZAAR) 50 mg, oral, Daily    metoprolol tartrate (LOPRESSOR) 25 mg, oral, 2 times daily    omeprazole (PRILOSEC) 40 mg, oral, 2 times daily before meals, Do not crush or chew.    ticagrelor (BRILINTA) 90 mg, oral, 2 times daily       Physical Exam:  Constitutional: Pleasant, Awake/Alert/Oriented to person place and time. No distress  Head:  Atraumatic, Normocephalic  Eyes: EOMI. JONATHAN  Neck: No JVD  Cardiovascular: Regular rate and rhythm, S1, S2. No extra heart sounds or murmurs  Respiratory: Clear to auscultation bilaterally. No wheezing, rales or rhonchi. Good chest wall expansion  Abdomen: Soft, Nontender. Bowel sounds appreciated  Musculoskeletal: ROM intact. Muscle strength grossly intact upper and lower extremities 5/5.   Neurological: CNII-XII intact. Sensation grossly intact  Extremities: Warm and dry. No acute rashes and lesions  Psychiatric: Appropriate mood and affect       Last Labs:  CBC -  Lab Results   Component Value Date    WBC 10.0 03/09/2024    HGB 13.8 03/09/2024    HCT 41.7 03/09/2024    MCV 88 03/09/2024     03/09/2024       CMP -  Lab Results   Component Value Date    CALCIUM 8.4 (L) 03/09/2024    PHOS 2.7 03/08/2024    PROT 6.5 11/19/2023    ALBUMIN 4.3 03/08/2024    AST 64 (H) 11/19/2023    ALT 44 11/19/2023    ALKPHOS 72 11/19/2023    BILITOT 0.9 11/19/2023       LIPID PANEL -   Lab Results   Component Value Date    CHOL 150 11/21/2023    TRIG 120 11/21/2023    HDL 27.4 11/21/2023    CHHDL 5.5 11/21/2023    LDLF 170 (H) 03/03/2020    VLDL 24 11/21/2023    NHDL 123 11/21/2023       RENAL FUNCTION PANEL -   Lab Results   Component Value Date    GLUCOSE 99 03/09/2024     03/09/2024    K 4.0 03/09/2024     (H) 03/09/2024    CO2 26 03/09/2024    ANIONGAP 10 03/09/2024    BUN 18 03/09/2024    CREATININE 0.97 03/09/2024    CALCIUM 8.4 (L) 03/09/2024    PHOS 2.7 03/08/2024    ALBUMIN 4.3 03/08/2024        Lab Results   Component Value Date     (H) 11/17/2023    HGBA1C 5.3 11/18/2023       Last Cardiology Tests:  ECG:  NSR, HR 66bpm    Echo:  Transthoracic Echo (TTE) Complete 11/20/2023  CONCLUSIONS:   1. Left ventricular systolic function is mildly decreased with a 40-45% estimated ejection fraction.   2. Entire apex and mid and apical anterior septum are abnormal.    Cath:  Cardiac catheterization - coronary  11/20/2023    CONCLUSIONS:   1. Left main: no significant angiographic disease.   2. LAD: 100% subtotal proximal NSTEMI culprit lesion, distal vesel fills via faint right to left collaterls.   3. LCx: no significant angiographic disease -- distal LCx became occluded from thromboembolism from proximal LAD during PCI.   4. RCA: no significant angiographic disease.   5. LVEDP 19mmhg, no aortic stenosis on LV-Ao gradient.   6. Successful PCI to proximal NSTEMI culprit lesion with one Synergy ANNIKA.   7. Successful PTCA to distal LCx/OM3 embolism that occurred during LAD PCI--0% residual stenosis and PRAKASH 3 flow.      Lab review: I have personally reviewed the laboratory result(s)   Diagnostic review: I have personally reviewed the result(s) of the Echocardiogram, [facility]     Assessment/Plan   Very pleasant 33 y.o. male from home with h/o NSTEMI s/p coronary angiography 11/20/2023 found to have 100% subtotally occluded proximal LAD (faint R-L collaterals) s/p ANNIKA as well as PTCA to distal Lcx as it became occluded from thromboembolism from prox LAD during PCI. EF 40-45% with apical hypokinesis, htn, hld presenting today for follow up. Doing well from a cardiac standpoint. BP elevated in office.     Plan:  -Continue aspirin 81mg daily indefinitely  -Continue Brilinta 90mg BID for a minimum of 1 year post PCI (I.e. through 11/2024)  -Increase losartan to 50mg daily  -Continue metoprolol tartrate 25mg BID and atorvastatin 80mg daily     ROMAIN Garcia-CNP

## 2024-05-28 LAB
ATRIAL RATE: 66 BPM
P AXIS: 8 DEGREES
P OFFSET: 181 MS
P ONSET: 135 MS
PR INTERVAL: 156 MS
Q ONSET: 213 MS
QRS COUNT: 11 BEATS
QRS DURATION: 88 MS
QT INTERVAL: 386 MS
QTC CALCULATION(BAZETT): 404 MS
QTC FREDERICIA: 398 MS
R AXIS: -26 DEGREES
T AXIS: 36 DEGREES
T OFFSET: 406 MS
VENTRICULAR RATE: 66 BPM

## 2024-06-23 ENCOUNTER — APPOINTMENT (OUTPATIENT)
Dept: RADIOLOGY | Facility: HOSPITAL | Age: 34
End: 2024-06-23
Payer: COMMERCIAL

## 2024-06-23 ENCOUNTER — HOSPITAL ENCOUNTER (EMERGENCY)
Facility: HOSPITAL | Age: 34
Discharge: HOME | End: 2024-06-23
Attending: NURSE PRACTITIONER
Payer: COMMERCIAL

## 2024-06-23 VITALS
HEIGHT: 69 IN | TEMPERATURE: 96.8 F | DIASTOLIC BLOOD PRESSURE: 82 MMHG | BODY MASS INDEX: 38.51 KG/M2 | WEIGHT: 260 LBS | SYSTOLIC BLOOD PRESSURE: 149 MMHG | OXYGEN SATURATION: 97 % | HEART RATE: 59 BPM | RESPIRATION RATE: 18 BRPM

## 2024-06-23 DIAGNOSIS — T14.8XXA HEMATOMA: ICD-10-CM

## 2024-06-23 DIAGNOSIS — S93.601A SPRAIN OF RIGHT FOOT, INITIAL ENCOUNTER: Primary | ICD-10-CM

## 2024-06-23 DIAGNOSIS — S93.401A SPRAIN OF RIGHT ANKLE, UNSPECIFIED LIGAMENT, INITIAL ENCOUNTER: ICD-10-CM

## 2024-06-23 PROCEDURE — 99284 EMERGENCY DEPT VISIT MOD MDM: CPT | Mod: 25

## 2024-06-23 PROCEDURE — 2500000001 HC RX 250 WO HCPCS SELF ADMINISTERED DRUGS (ALT 637 FOR MEDICARE OP): Performed by: NURSE PRACTITIONER

## 2024-06-23 PROCEDURE — 73700 CT LOWER EXTREMITY W/O DYE: CPT | Mod: RT

## 2024-06-23 PROCEDURE — 73610 X-RAY EXAM OF ANKLE: CPT | Mod: RT

## 2024-06-23 PROCEDURE — 73610 X-RAY EXAM OF ANKLE: CPT | Mod: RIGHT SIDE | Performed by: STUDENT IN AN ORGANIZED HEALTH CARE EDUCATION/TRAINING PROGRAM

## 2024-06-23 PROCEDURE — 73630 X-RAY EXAM OF FOOT: CPT | Mod: RT

## 2024-06-23 PROCEDURE — 73630 X-RAY EXAM OF FOOT: CPT | Mod: RIGHT SIDE | Performed by: STUDENT IN AN ORGANIZED HEALTH CARE EDUCATION/TRAINING PROGRAM

## 2024-06-23 RX ORDER — TIZANIDINE 4 MG/1
4 TABLET ORAL EVERY 6 HOURS PRN
Qty: 30 TABLET | Refills: 0 | Status: SHIPPED | OUTPATIENT
Start: 2024-06-23 | End: 2024-07-03

## 2024-06-23 RX ORDER — OXYCODONE AND ACETAMINOPHEN 5; 325 MG/1; MG/1
2 TABLET ORAL ONCE
Status: COMPLETED | OUTPATIENT
Start: 2024-06-23 | End: 2024-06-23

## 2024-06-23 RX ORDER — OXYCODONE AND ACETAMINOPHEN 5; 325 MG/1; MG/1
1 TABLET ORAL ONCE
Status: COMPLETED | OUTPATIENT
Start: 2024-06-23 | End: 2024-06-23

## 2024-06-23 RX ORDER — OXYCODONE AND ACETAMINOPHEN 5; 325 MG/1; MG/1
1 TABLET ORAL EVERY 6 HOURS PRN
Qty: 12 TABLET | Refills: 0 | Status: SHIPPED | OUTPATIENT
Start: 2024-06-23 | End: 2024-06-26

## 2024-06-23 ASSESSMENT — PAIN - FUNCTIONAL ASSESSMENT: PAIN_FUNCTIONAL_ASSESSMENT: 0-10

## 2024-06-23 ASSESSMENT — LIFESTYLE VARIABLES
EVER HAD A DRINK FIRST THING IN THE MORNING TO STEADY YOUR NERVES TO GET RID OF A HANGOVER: NO
EVER FELT BAD OR GUILTY ABOUT YOUR DRINKING: NO
HAVE PEOPLE ANNOYED YOU BY CRITICIZING YOUR DRINKING: NO
HAVE YOU EVER FELT YOU SHOULD CUT DOWN ON YOUR DRINKING: NO
TOTAL SCORE: 0

## 2024-06-23 ASSESSMENT — PAIN DESCRIPTION - ORIENTATION: ORIENTATION: RIGHT

## 2024-06-23 ASSESSMENT — PAIN SCALES - GENERAL
PAINLEVEL_OUTOF10: 3
PAINLEVEL_OUTOF10: 4
PAINLEVEL_OUTOF10: 3
PAINLEVEL_OUTOF10: 3

## 2024-06-23 ASSESSMENT — PAIN DESCRIPTION - PAIN TYPE: TYPE: ACUTE PAIN

## 2024-06-23 ASSESSMENT — PAIN DESCRIPTION - LOCATION: LOCATION: FOOT

## 2024-06-23 NOTE — ED PROVIDER NOTES
[facility]  [facility]  ED  Encounter Note  Admit Date/RoomTime: 2024  4:40 PM  [facility]  NAME: Narciso Coates  : 1990  MRN: [id number]     Chief Complaint:  Foot Injury (A bush hog fell 7 inches and landed on his rt foot. Pt unable to ambulate and has a large hematoma on top of foot.)    HISTORY OF PRESENT ILLNESS        Narciso Coates is a 34 y.o. male who presents to the ED for evaluation of right ankle and foot pain that started 1 hour prior to arrival. They are in the process of moving when a brush hog fell onto his right foot. Weigh over  400 wm4885 lbs. Immediate pain and swelling. On aspirin and blood thinner. Able to wiggle toes but painful. Immediate ice applied.     ROS   Pertinent positives and negatives are stated within HPI, all other systems reviewed and are negative.    Past Medical History:  has a past medical history of Hypertension.    Surgical History:  has a past surgical history that includes Abdominal surgery and Cardiac catheterization (N/A, 2023).    Social History:  reports that he has never smoked. He has quit using smokeless tobacco.  His smokeless tobacco use included chew. He reports that he does not currently use alcohol. He reports that he does not use drugs.    Family History: family history is not on file.     Allergies: Patient has no known allergies.    PHYSICAL EXAM   Oxygen Saturation Interpretation: Normal.      Physical Exam  Constitutional/General: Alert and oriented x3, well appearing, non toxic  HEENT:  NC/NT. PERRLA.  Airway patent.  Neck: Supple, full ROM. No midline vertebral tenderness or crepitus.   Respiratory: Lung sounds clear to auscultation bilaterally. No wheezes, rhonchi or stridor. Not in respiratory distress.  CV:  Regular rate. Regular rhythm. No murmurs or rubs. 2+ distal pulses.  GI:  Abdomen soft, non-tender, non-distended. +BS. No rebound, guarding, or rigidity. No pulsatile masses.  Musculoskeletal: Moves  all extremities x 4. Warm and well perfused. Capillary refill <3 seconds  Integument: Skin warm and dry. No rashes.   Neurologic: Alert and oriented with no focal deficits, symmetric strength 5/5 in the upper and lower extremities bilaterally.  Psychiatric: Normal affect//immediate pain and swelling to right foot pulse +2 able to wiggle toes but painful    Lab / Imaging Results   (All laboratory and radiology results have been personally reviewed by myself)  Labs:  Results for orders placed or performed in visit on 05/09/24   ECG 12 lead (Clinic Performed)   Result Value Ref Range    Ventricular Rate 66 BPM    Atrial Rate 66 BPM    AR Interval 156 ms    QRS Duration 88 ms    QT Interval 386 ms    QTC Calculation(Bazett) 404 ms    P Axis 8 degrees    R Axis -26 degrees    T Axis 36 degrees    QRS Count 11 beats    Q Onset 213 ms    P Onset 135 ms    P Offset 181 ms    T Offset 406 ms    QTC Fredericia 398 ms     Imaging:  All Radiology results interpreted by Radiologist unless otherwise noted.  CT foot right wo IV contrast   Final Result   1. A small martha of osseous fragment along the medial aspect of 2nd   tarsometatarsal joint with suggestion of mild lateral offset of the   2nd metatarsal base with respect to the middle cuneiform.   Constellation of these findings are concerning for a degree of   ligamentous avulsion. Definitive evaluation is limited due to CT   nonweightbearing technique. An MRI can be obtained for definitive   evaluation.   2. Large subcutaneous soft tissue hematoma in the dorsal subcutaneous   soft tissues of the foot measuring up to 7 x 6 cm.   3. Age-indeterminate osteochondral lesion along the medial talar dome   measuring up to 1.2 x 1.1 cm with findings concerning for   instability. Recommend attention on MRI.             MACRO:   Critical Finding:  See findings. Notification was initiated on   6/23/2024 at 9:03 pm by Dr. Alissa Rey.  (**-YCF-**)        Signed by: Alissa Rey  6/23/2024 9:03 PM   Dictation workstation:   [id number]      XR foot right 3+ views   Final Result   1. Large hematoma overlying the dorsal forefoot measuring 6 cm x 2 cm.        2. No acute fracture or malalignment of the right foot or ankle.        3. Remote fracture of the lateral malleolus and chronic osteochondral   lesion of the medial talar dome measuring 0.9 cm wide. Recommend   orthopedic follow-up.        MACRO:   None.        Signed by: Emeterio Bingham 6/23/2024 6:10 PM   Dictation workstation:   [id number]      XR ankle right 3+ views   Final Result   1. Large hematoma overlying the dorsal forefoot measuring 6 cm x 2 cm.        2. No acute fracture or malalignment of the right foot or ankle.        3. Remote fracture of the lateral malleolus and chronic osteochondral   lesion of the medial talar dome measuring 0.9 cm wide. Recommend   orthopedic follow-up.        MACRO:   None.        Signed by: Emeterio Bingham 6/23/2024 6:10 PM   Dictation workstation:   [id number]          ED Course / Medical Decision Making     Medications   oxyCODONE-acetaminophen (Percocet) 5-325 mg per tablet 1 tablet (has no administration in time range)   oxyCODONE-acetaminophen (Percocet) 5-325 mg per tablet 2 tablet (1 tablet oral Given 6/23/24 1702)     Diagnoses as of 06/23/24 2122   Sprain of right foot, initial encounter   Sprain of right ankle, unspecified ligament, initial encounter   Hematoma     Re-examination:    Patient’s condition stable    Consult(s):   Dr Mendelzoon saw and evaluated in ED    Procedure(s):   Personally supervised and inspected the walking boot placed by nursing. The extremity is appropriately immobilized. Patient neurovascularly intact before and after the application.    MDM:       Narciso Coates is a 34 y.o. male who presents to the ED for evaluation of right ankle and foot pain that started 1 hour prior to arrival. They are in the process of moving when a brush hog fell onto his right  foot. Weigh over 400 to 1000 lbs. Immediate pain and swelling. On aspirin and blood thinner. Able to wiggle toes but painful. Immediate ice applied.     ED course stable  Patient evaluated by Dr Mendelzoon who will see pt on Tuesday.   Large hematoma on foot, see photos below.  Xray negative, ct obtained.  May have possible ligamentous injury.  Placed in walking boot, crutches.  Pain meds.  Off work until next Monday or cleared by Dr SORTO.  Return if any worsening.  Risks of abuse discussed with pt.  Can use tylenol if needed (on blood thinner no ibuprofen).  Ddx: right foot and ankle injury hematoma       Plan of Care/Counseling:  I reviewed today's visit with the patient and spouse in addition to providing specific details for the plan of care and counseling regarding the diagnosis and prognosis.  Questions are answered at this time and are agreeable with the plan.    ASSESSMENT     1. Sprain of right foot, initial encounter    2. Sprain of right ankle, unspecified ligament, initial encounter    3. Hematoma      PLAN   Home Nonsteroidals, Tylenol, Narcotic pain medication, Muscle relaxants, Referral Dr Mendelzoon , Advised to return for signs of head injury, weakness, numbness or tingling to extremities, incontinence, and Advised to return for worsening or additional problems such as abdominal or chest pain  Diagnostic tests were reviewed and questions answered. Diagnosis, care plan and treatment options were discussed. The patient understand instructions and will follow up as directed.  Condition stable  The patient was given verbal follow-up instructions  Patient condition is good    New Medications     New Medications Ordered This Visit   Medications    oxyCODONE-acetaminophen (Percocet) 5-325 mg per tablet 2 tablet     Order Specific Question:   If ordered PRN for pain, nurse is permitted to administer this medication for higher pain scores based on patient preference?     Answer:   Yes    tiZANidine (Zanaflex) 4  mg tablet     Sig: Take 1 tablet (4 mg) by mouth every 6 hours if needed for muscle spasms for up to 10 days.     Dispense:  30 tablet     Refill:  0    oxyCODONE-acetaminophen (Percocet) 5-325 mg tablet     Sig: Take 1 tablet by mouth every 6 hours if needed for severe pain (7 - 10) for up to 3 days.     Dispense:  12 tablet     Refill:  0    oxyCODONE-acetaminophen (Percocet) 5-325 mg per tablet 1 tablet     Order Specific Question:   If ordered PRN for pain, nurse is permitted to administer this medication for higher pain scores based on patient preference?     Answer:   Yes     Electronically signed by MIRELLA Levy   **This report was transcribed using voice recognition software. Every effort was made to ensure accuracy; however, inadvertent computerized transcription errors may be present.  END OF ED PROVIDER NOTE                   MIRELLA Levy  06/23/24 [id number]

## 2024-06-23 NOTE — ED TRIAGE NOTES
A [place] hog fell 7 inches and landed on his rt foot. Pt unable to ambulate and has a large hematoma on top of foot.

## 2024-06-23 NOTE — Clinical Note
Narciso Coates was seen and treated in our emergency department on 6/23/2024.  He may return to work on 07/01/2024.  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      Staci Rodriguez, APRN-CNP

## 2024-06-23 NOTE — DISCHARGE INSTRUCTIONS
ELEVATE ICE FOR 15 TO 20 MN EVERY 4-6 HOURS  WALKING BOOT DOES NOT MEAN WALKING ON IT  NON WEIGHT BEARING  USE PAIN MEDS AND MUSCLE RELAXERS

## 2024-08-14 DIAGNOSIS — I21.4 NSTEMI (NON-ST ELEVATED MYOCARDIAL INFARCTION) (MULTI): ICD-10-CM

## 2024-08-17 RX ORDER — ATORVASTATIN CALCIUM 80 MG/1
80 TABLET, FILM COATED ORAL NIGHTLY
Qty: 30 TABLET | Refills: 0 | Status: SHIPPED | OUTPATIENT
Start: 2024-08-17

## 2024-09-12 DIAGNOSIS — I21.4 NSTEMI (NON-ST ELEVATED MYOCARDIAL INFARCTION) (MULTI): ICD-10-CM

## 2024-09-13 RX ORDER — ATORVASTATIN CALCIUM 80 MG/1
80 TABLET, FILM COATED ORAL NIGHTLY
Qty: 30 TABLET | Refills: 0 | Status: SHIPPED | OUTPATIENT
Start: 2024-09-13

## 2024-09-23 ENCOUNTER — APPOINTMENT (OUTPATIENT)
Dept: CARDIOLOGY | Facility: HOSPITAL | Age: 34
End: 2024-09-23
Payer: COMMERCIAL

## 2024-09-23 ENCOUNTER — HOSPITAL ENCOUNTER (EMERGENCY)
Facility: HOSPITAL | Age: 34
Discharge: HOME | End: 2024-09-23
Attending: STUDENT IN AN ORGANIZED HEALTH CARE EDUCATION/TRAINING PROGRAM
Payer: COMMERCIAL

## 2024-09-23 ENCOUNTER — APPOINTMENT (OUTPATIENT)
Dept: RADIOLOGY | Facility: HOSPITAL | Age: 34
End: 2024-09-23
Payer: COMMERCIAL

## 2024-09-23 VITALS
TEMPERATURE: 96.8 F | OXYGEN SATURATION: 98 % | DIASTOLIC BLOOD PRESSURE: 113 MMHG | RESPIRATION RATE: 20 BRPM | WEIGHT: 265 LBS | HEART RATE: 79 BPM | BODY MASS INDEX: 37.94 KG/M2 | HEIGHT: 70 IN | SYSTOLIC BLOOD PRESSURE: 150 MMHG

## 2024-09-23 DIAGNOSIS — R07.9 CHEST PAIN, UNSPECIFIED TYPE: Primary | ICD-10-CM

## 2024-09-23 LAB
ALBUMIN SERPL BCP-MCNC: 4.1 G/DL (ref 3.4–5)
ALP SERPL-CCNC: 76 U/L (ref 33–120)
ALT SERPL W P-5'-P-CCNC: 44 U/L (ref 10–52)
ANION GAP SERPL CALC-SCNC: 11 MMOL/L (ref 10–20)
AST SERPL W P-5'-P-CCNC: 21 U/L (ref 9–39)
BASOPHILS # BLD AUTO: 0.05 X10*3/UL (ref 0–0.1)
BASOPHILS NFR BLD AUTO: 0.6 %
BILIRUB SERPL-MCNC: 0.6 MG/DL (ref 0–1.2)
BNP SERPL-MCNC: 82 PG/ML (ref 0–99)
BUN SERPL-MCNC: 15 MG/DL (ref 6–23)
CALCIUM SERPL-MCNC: 8.8 MG/DL (ref 8.6–10.3)
CARDIAC TROPONIN I PNL SERPL HS: 6 NG/L (ref 0–20)
CARDIAC TROPONIN I PNL SERPL HS: 9 NG/L (ref 0–20)
CHLORIDE SERPL-SCNC: 106 MMOL/L (ref 98–107)
CO2 SERPL-SCNC: 26 MMOL/L (ref 21–32)
CREAT SERPL-MCNC: 1.02 MG/DL (ref 0.5–1.3)
EGFRCR SERPLBLD CKD-EPI 2021: >90 ML/MIN/1.73M*2
EOSINOPHIL # BLD AUTO: 0.33 X10*3/UL (ref 0–0.7)
EOSINOPHIL NFR BLD AUTO: 3.7 %
ERYTHROCYTE [DISTWIDTH] IN BLOOD BY AUTOMATED COUNT: 11.7 % (ref 11.5–14.5)
FLUAV RNA RESP QL NAA+PROBE: NOT DETECTED
FLUBV RNA RESP QL NAA+PROBE: NOT DETECTED
GLUCOSE SERPL-MCNC: 104 MG/DL (ref 74–99)
HCT VFR BLD AUTO: 43.8 % (ref 41–52)
HGB BLD-MCNC: 14.9 G/DL (ref 13.5–17.5)
IMM GRANULOCYTES # BLD AUTO: 0.04 X10*3/UL (ref 0–0.7)
IMM GRANULOCYTES NFR BLD AUTO: 0.5 % (ref 0–0.9)
LIPASE SERPL-CCNC: 9 U/L (ref 9–82)
LYMPHOCYTES # BLD AUTO: 2.24 X10*3/UL (ref 1.2–4.8)
LYMPHOCYTES NFR BLD AUTO: 25.3 %
MAGNESIUM SERPL-MCNC: 1.91 MG/DL (ref 1.6–2.4)
MCH RBC QN AUTO: 29.2 PG (ref 26–34)
MCHC RBC AUTO-ENTMCNC: 34 G/DL (ref 32–36)
MCV RBC AUTO: 86 FL (ref 80–100)
MONOCYTES # BLD AUTO: 0.51 X10*3/UL (ref 0.1–1)
MONOCYTES NFR BLD AUTO: 5.7 %
NEUTROPHILS # BLD AUTO: 5.7 X10*3/UL (ref 1.2–7.7)
NEUTROPHILS NFR BLD AUTO: 64.2 %
NRBC BLD-RTO: 0 /100 WBCS (ref 0–0)
PLATELET # BLD AUTO: 275 X10*3/UL (ref 150–450)
POTASSIUM SERPL-SCNC: 3.8 MMOL/L (ref 3.5–5.3)
PROT SERPL-MCNC: 6.9 G/DL (ref 6.4–8.2)
RBC # BLD AUTO: 5.1 X10*6/UL (ref 4.5–5.9)
SARS-COV-2 RNA RESP QL NAA+PROBE: NOT DETECTED
SODIUM SERPL-SCNC: 139 MMOL/L (ref 136–145)
WBC # BLD AUTO: 8.9 X10*3/UL (ref 4.4–11.3)

## 2024-09-23 PROCEDURE — 93005 ELECTROCARDIOGRAM TRACING: CPT

## 2024-09-23 PROCEDURE — 96374 THER/PROPH/DIAG INJ IV PUSH: CPT

## 2024-09-23 PROCEDURE — 36415 COLL VENOUS BLD VENIPUNCTURE: CPT | Performed by: STUDENT IN AN ORGANIZED HEALTH CARE EDUCATION/TRAINING PROGRAM

## 2024-09-23 PROCEDURE — 2500000004 HC RX 250 GENERAL PHARMACY W/ HCPCS (ALT 636 FOR OP/ED)

## 2024-09-23 PROCEDURE — 85025 COMPLETE CBC W/AUTO DIFF WBC: CPT | Performed by: STUDENT IN AN ORGANIZED HEALTH CARE EDUCATION/TRAINING PROGRAM

## 2024-09-23 PROCEDURE — 2500000005 HC RX 250 GENERAL PHARMACY W/O HCPCS

## 2024-09-23 PROCEDURE — 80053 COMPREHEN METABOLIC PANEL: CPT | Performed by: STUDENT IN AN ORGANIZED HEALTH CARE EDUCATION/TRAINING PROGRAM

## 2024-09-23 PROCEDURE — 84484 ASSAY OF TROPONIN QUANT: CPT | Performed by: STUDENT IN AN ORGANIZED HEALTH CARE EDUCATION/TRAINING PROGRAM

## 2024-09-23 PROCEDURE — 87636 SARSCOV2 & INF A&B AMP PRB: CPT

## 2024-09-23 PROCEDURE — 2500000001 HC RX 250 WO HCPCS SELF ADMINISTERED DRUGS (ALT 637 FOR MEDICARE OP)

## 2024-09-23 PROCEDURE — 83690 ASSAY OF LIPASE: CPT | Performed by: STUDENT IN AN ORGANIZED HEALTH CARE EDUCATION/TRAINING PROGRAM

## 2024-09-23 PROCEDURE — 71046 X-RAY EXAM CHEST 2 VIEWS: CPT | Performed by: RADIOLOGY

## 2024-09-23 PROCEDURE — 83880 ASSAY OF NATRIURETIC PEPTIDE: CPT | Performed by: STUDENT IN AN ORGANIZED HEALTH CARE EDUCATION/TRAINING PROGRAM

## 2024-09-23 PROCEDURE — 99284 EMERGENCY DEPT VISIT MOD MDM: CPT | Mod: 25

## 2024-09-23 PROCEDURE — 71046 X-RAY EXAM CHEST 2 VIEWS: CPT

## 2024-09-23 PROCEDURE — 83735 ASSAY OF MAGNESIUM: CPT | Performed by: STUDENT IN AN ORGANIZED HEALTH CARE EDUCATION/TRAINING PROGRAM

## 2024-09-23 RX ORDER — LOSARTAN POTASSIUM 25 MG/1
50 TABLET ORAL ONCE
Status: COMPLETED | OUTPATIENT
Start: 2024-09-23 | End: 2024-09-23

## 2024-09-23 RX ORDER — ACETAMINOPHEN 325 MG/1
975 TABLET ORAL ONCE
Status: COMPLETED | OUTPATIENT
Start: 2024-09-23 | End: 2024-09-23

## 2024-09-23 RX ORDER — FAMOTIDINE 10 MG/ML
40 INJECTION INTRAVENOUS ONCE
Status: COMPLETED | OUTPATIENT
Start: 2024-09-23 | End: 2024-09-23

## 2024-09-23 ASSESSMENT — PAIN SCALES - GENERAL
PAINLEVEL_OUTOF10: 5 - MODERATE PAIN
PAINLEVEL_OUTOF10: 5 - MODERATE PAIN

## 2024-09-23 ASSESSMENT — LIFESTYLE VARIABLES
EVER HAD A DRINK FIRST THING IN THE MORNING TO STEADY YOUR NERVES TO GET RID OF A HANGOVER: NO
HAVE YOU EVER FELT YOU SHOULD CUT DOWN ON YOUR DRINKING: NO
EVER FELT BAD OR GUILTY ABOUT YOUR DRINKING: NO
HAVE PEOPLE ANNOYED YOU BY CRITICIZING YOUR DRINKING: NO
TOTAL SCORE: 0

## 2024-09-23 ASSESSMENT — PAIN DESCRIPTION - DESCRIPTORS
DESCRIPTORS: ACHING
DESCRIPTORS: STABBING

## 2024-09-23 ASSESSMENT — PAIN - FUNCTIONAL ASSESSMENT: PAIN_FUNCTIONAL_ASSESSMENT: 0-10

## 2024-09-23 NOTE — ED NOTES
Pt arrives with c/o right sided chest pain onset Friday. Pt denies SOB, lightheadedness, n/v/d. Pt reports hx of MI last year. Pt currently in no obvious distress.      Reilly Tapia RN  09/23/24 0743

## 2024-09-23 NOTE — DISCHARGE INSTRUCTIONS
You presented to the ED for chest pain.  You were noted to have an unremarkable cardiac workup in the emergency department.  Follow-up with cardiology and primary care within the next 3 days.  Please return to the emergency department for any new or worsening symptoms including but limited to concerning chest pain difficulty breathing.

## 2024-09-23 NOTE — ED PROVIDER NOTES
CC: No chief complaint on file.     HPI:  Patient is a 34-year-old male with a past medical history of hypertension, NSTEMI, and CAD s/p PCI with stenting of LAD who presents to the ED for chest pain.  Patient notes that he has had right pressure-like chest pain over the past 3 days.  Rates his pain as 4 out of 10.  Notes the pain at rest that improves with movement.  Patient is compliance with his home medications including antihypertensives, Plavix, and aspirin.  Patient has also noted a nonproductive cough.  Denied fevers, chills, nausea, vomiting, difficulty breathing, lower extremity edema, history of blood clots, headache, neck pain, back pain, and abdominal pain.    Limitations to history: None  Independent historian(s): Patient  Records Reviewed: Recent available ED and inpatient notes reviewed in EMR.    PMHx/PSHx:  Per HPI.   - has a past medical history of Hypertension.  - has a past surgical history that includes Abdominal surgery and Cardiac catheterization (N/A, 11/20/2023).    Medications:  Reviewed in EMR. See EMR for complete list of medications and doses.    Allergies:  Patient has no known allergies.    Social History:  - Tobacco:  reports that he has never smoked. He has quit using smokeless tobacco.  His smokeless tobacco use included chew.   - Alcohol:  reports that he does not currently use alcohol.   - Illicit Drugs:  reports no history of drug use.     ROS:  Per HPI.       ???????????????????????????????????????????????????????????????  Triage Vitals:  T 36 °C (96.8 °F)  HR 87  BP (!) 163/100  RR 19  O2 98 %      Physical Exam  Vitals and nursing note reviewed.   Constitutional:       General: He is not in acute distress.  HENT:      Head: Normocephalic and atraumatic.      Nose: Nose normal.      Mouth/Throat:      Mouth: Mucous membranes are moist.   Eyes:      Conjunctiva/sclera: Conjunctivae normal.   Cardiovascular:      Rate and Rhythm: Normal rate and regular rhythm.      Pulses:  Normal pulses.   Pulmonary:      Effort: Pulmonary effort is normal. No respiratory distress.      Breath sounds: Normal breath sounds.   Abdominal:      Palpations: Abdomen is soft.      Tenderness: There is no abdominal tenderness.   Skin:     General: Skin is warm.   Neurological:      General: No focal deficit present.      Mental Status: He is alert.           ???????????????????????????????????????????????????????????????  Labs:   Labs Reviewed - No data to display     Imaging:   No orders to display        MDM:  Patient is a 34-year-old male with a past medical history of hypertension, NSTEMI, and CAD s/p PCI with stenting of LAD who presents to the ED for chest pain.  Patient presented hypertensive at 163/100 with remainder of vitals WNL.  Physical exam findings significant for 34-year-old male in no acute distress.  Low medical concern for acute aortic process, pneumothorax, and PE.  PE ruled out via PERC criteria.  Patient administered Tylenol, home losartan, Pepcid, and BMX.  Low clinical concern for ACS with unremarkable EKG, BNP, and troponins.  Lipase was normal at 9.  Metabolic panel was unremarkable including electrolytes.  CBC did not show leukocytosis or anemia.  COVID and flu testing was negative.  CXR did not display acute cardiopulmonary process including findings concerning for pneumonia or pneumothorax.  Upon reevaluation, patient denies any improvement of his pain.  Patient noted to have a heart score of 2 given risk factors.  Patient's chest pain may be secondary to viral syndrome.  Discussed ED findings, heart score, outpatient cardiology follow-up within the next 3 days, and strict return precautions for any new or worsening symptoms including but limited to concerning chest pain and difficulty breathing.  Patient stated understanding and agreement with the plan.  All questions were answered.  Patient discharged in stable condition.    ED Course:  ED Course as of 09/23/24 1527   Mon Sep  23, 2024   1221 EKG: Rate is 78, sinus rhythm, normal axis, left anterior fascicular block, no st elevation or depression.  When compared to EKG on 5/9/2024 review of EKG does not show any signs of STEMI, complete heart block, asystole, V-fib. [MH]      ED Course User Index  [MH] Jonnie Castañeda MD         Diagnoses as of 09/23/24 1527   Chest pain, unspecified type       Social Determinants Limiting Care:  None identified    Disposition:  Discharge    Jonnie Castañeda MD   Emergency Medicine PGY-3  [facility]    Comment: Please note this report has been produced using speech recognition software and may contain errors related to that system including errors in grammar, punctuation, and spelling as well as words and phrases that may be inappropriate.  If there are any questions or concerns please feel free to contact the dictating provider for clarification.    Procedures ? Smart[employer] last updated 9/23/2024 12:20 PM        Jonnie Castañeda MD  Resident  09/23/24 3568

## 2024-09-25 LAB
ATRIAL RATE: 78 BPM
P AXIS: 6 DEGREES
P OFFSET: 183 MS
P ONSET: 138 MS
PR INTERVAL: 154 MS
Q ONSET: 215 MS
QRS COUNT: 13 BEATS
QRS DURATION: 88 MS
QT INTERVAL: 374 MS
QTC CALCULATION(BAZETT): 426 MS
QTC FREDERICIA: 408 MS
R AXIS: -59 DEGREES
T AXIS: 14 DEGREES
T OFFSET: 402 MS
VENTRICULAR RATE: 78 BPM

## 2024-10-12 DIAGNOSIS — I21.4 NSTEMI (NON-ST ELEVATED MYOCARDIAL INFARCTION) (MULTI): ICD-10-CM

## 2024-10-15 RX ORDER — ATORVASTATIN CALCIUM 80 MG/1
80 TABLET, FILM COATED ORAL NIGHTLY
Qty: 30 TABLET | Refills: 0 | Status: SHIPPED | OUTPATIENT
Start: 2024-10-15

## 2024-11-07 ENCOUNTER — OFFICE VISIT (OUTPATIENT)
Dept: CARDIOLOGY | Facility: HOSPITAL | Age: 34
End: 2024-11-07
Payer: COMMERCIAL

## 2024-11-07 VITALS
HEART RATE: 65 BPM | DIASTOLIC BLOOD PRESSURE: 97 MMHG | BODY MASS INDEX: 39 KG/M2 | SYSTOLIC BLOOD PRESSURE: 154 MMHG | OXYGEN SATURATION: 95 % | WEIGHT: 271.83 LBS

## 2024-11-07 DIAGNOSIS — I21.4 NSTEMI (NON-ST ELEVATED MYOCARDIAL INFARCTION) (MULTI): ICD-10-CM

## 2024-11-07 PROCEDURE — 99214 OFFICE O/P EST MOD 30 MIN: CPT | Performed by: NURSE PRACTITIONER

## 2024-11-07 RX ORDER — ATORVASTATIN CALCIUM 80 MG/1
80 TABLET, FILM COATED ORAL NIGHTLY
Qty: 90 TABLET | Refills: 3 | Status: SHIPPED | OUTPATIENT
Start: 2024-11-07 | End: 2025-11-07

## 2024-11-07 RX ORDER — LOSARTAN POTASSIUM 100 MG/1
100 TABLET ORAL DAILY
Qty: 90 TABLET | Refills: 3 | Status: SHIPPED | OUTPATIENT
Start: 2024-11-07 | End: 2025-11-07

## 2024-11-07 RX ORDER — METOPROLOL SUCCINATE 50 MG/1
50 TABLET, EXTENDED RELEASE ORAL DAILY
Qty: 90 TABLET | Refills: 3 | Status: SHIPPED | OUTPATIENT
Start: 2024-11-07 | End: 2025-11-07

## 2024-11-08 NOTE — PROGRESS NOTES
Chief Complaint:   Follow up      History Of Present Illness:    Narciso Coates is a 34 y.o. male from home with h/o NSTEMI s/p coronary angiography 11/20/2023 found to have 100% subtotally occluded proximal LAD (faint R-L collaterals) s/p ANNIKA as well as PTCA to distal Lcx as it became occluded from thromboembolism from prox LAD during PCI. EF 40-45% with apical hypokinesis, htn, hld presenting today for follow up.  Did have an isolated event of chest pain in September-- pressure midsternally that occurred at rest.  Went to the ER-troponins negative and discharged home.  Has not had recurrent chest pain since this event.  Denies SOB, palpitations, dizziness.       Last Recorded Vitals:  Vitals:    11/07/24 1343   BP: (!) 154/97   Pulse: 65   SpO2: 95%   Weight: 123 kg (271 lb 13.2 oz)       Past Medical History:  He has a past medical history of Hypertension.    Past Surgical History:  He has a past surgical history that includes Abdominal surgery and Cardiac catheterization (N/A, 11/20/2023).      Social History:  He reports that he has never smoked. He has quit using smokeless tobacco.  His smokeless tobacco use included chew. He reports that he does not currently use alcohol. He reports that he does not use drugs.    Family History:  No family history on file.     Allergies:  Patient has no known allergies.    Outpatient Medications:  Current Outpatient Medications   Medication Instructions    aspirin 81 mg, oral, Daily    atorvastatin (LIPITOR) 80 mg, oral, Nightly    losartan (COZAAR) 100 mg, oral, Daily    metoprolol succinate XL (TOPROL-XL) 50 mg, oral, Daily, Do not crush or chew.    tiZANidine (ZANAFLEX) 4 mg, oral, Every 6 hours PRN       Physical Exam:  Constitutional: Pleasant, Awake/Alert/Oriented to person place and time. No distress  Head: Atraumatic, Normocephalic  Eyes: EOMI. JONATHAN  Neck:No JVD  Cardiovascular: Regular rate and rhythm, S1, S2. No extra heart sounds or murmurs  Respiratory: Clear to  auscultation bilaterally. No wheezing, rales or rhonchi.   Abdomen: Soft, Nontender. Bowel sounds appreciated  Musculoskeletal: ROM intact. Muscle strength grossly intact upper and lower extremities 5/5.   Neurological: CNII-XII intact. Sensation grossly intact  Extremities: Warm and dry. No acute rashes and lesions  Psychiatric: Appropriate mood and affect       Last Labs:  CBC -  Lab Results   Component Value Date    WBC 8.9 09/23/2024    HGB 14.9 09/23/2024    HCT 43.8 09/23/2024    MCV 86 09/23/2024     09/23/2024       CMP -  Lab Results   Component Value Date    CALCIUM 8.8 09/23/2024    PHOS 2.7 03/08/2024    PROT 6.9 09/23/2024    ALBUMIN 4.1 09/23/2024    AST 21 09/23/2024    ALT 44 09/23/2024    ALKPHOS 76 09/23/2024    BILITOT 0.6 09/23/2024       LIPID PANEL -   Lab Results   Component Value Date    CHOL 150 11/21/2023    TRIG 120 11/21/2023    HDL 27.4 11/21/2023    CHHDL 5.5 11/21/2023    LDLF 170 (H) 03/03/2020    VLDL 24 11/21/2023    NHDL 123 11/21/2023       RENAL FUNCTION PANEL -   Lab Results   Component Value Date    GLUCOSE 104 (H) 09/23/2024     09/23/2024    K 3.8 09/23/2024     09/23/2024    CO2 26 09/23/2024    ANIONGAP 11 09/23/2024    BUN 15 09/23/2024    CREATININE 1.02 09/23/2024    CALCIUM 8.8 09/23/2024    PHOS 2.7 03/08/2024    ALBUMIN 4.1 09/23/2024        Lab Results   Component Value Date    BNP 82 09/23/2024    HGBA1C 5.3 11/18/2023       Last Cardiology Tests:  ECG:  ECG 12 lead 09/23/2024  NSR, HR 78bpm    Echo:  Transthoracic Echo (TTE) Complete 11/20/2023  CONCLUSIONS:   1. Left ventricular systolic function is mildly decreased with a 40-45% estimated ejection fraction.   2. Entire apex and mid and apical anterior septum are abnormal.     Cath:  Cardiac catheterization - coronary 11/20/2023  CONCLUSIONS:   1. Left main: no significant angiographic disease.   2. LAD: 100% subtotal proximal NSTEMI culprit lesion, distal vesel fills via faint right to left  trisha.   3. LCx: no significant angiographic disease -- distal LCx became occluded from thromboembolism from proximal LAD during PCI.   4. RCA: no significant angiographic disease.   5. LVEDP 19mmhg, no aortic stenosis on LV-Ao gradient.   6. Successful PCI to proximal NSTEMI culprit lesion with one Synergy ANNIKA.   7. Successful PTCA to distal LCx/OM3 embolism that occurred during LAD PCI--0% residual stenosis and PRAKASH 3 flow.        Lab review: I have personally reviewed the laboratory result(s)   Diagnostic review: I have personally reviewed the result(s) of the Echocardiogram, [facility]     Lab review: I have personally reviewed the laboratory result(s)   Diagnostic review: I have personally reviewed the result(s) of the Echocardiogram, [facility]     Assessment/Plan   Very pleasant 34 y.o. male from home with h/o NSTEMI s/p coronary angiography 11/20/2023 found to have 100% subtotally occluded proximal LAD (faint R-L collaterals) s/p ANNIKA as well as PTCA to distal Lcx as it became occluded from thromboembolism from prox LAD during PCI. EF 40-45% with apical hypokinesis, htn, hld presenting today for follow up.  Did have an isolated event of chest pain in September-- pressure midsternally that occurred at rest.  Went to the ER-troponins negative and discharged home.  Has not had recurrent chest pain since this event.  Denies SOB, palpitations, dizziness.  BP uncontrolled.       Plan:  -Increase losartan to 100mg daily  -Switch metoprolol tartrate to succinate for ease of dosing-- 50mg daily  -Continue aspirin 81mg daily indefinitely  -May stop Brilinta at the end of November 2024 as it has been >1 year post PCI  -Continue atorvastatin 80mg daily   -Follow up in 1 year or sooner if needed       ROMAIN Garcia-CNP

## 2024-11-11 ENCOUNTER — OFFICE VISIT (OUTPATIENT)
Dept: URGENT CARE | Age: 34
End: 2024-11-11
Payer: COMMERCIAL

## 2024-11-11 ENCOUNTER — ANCILLARY PROCEDURE (OUTPATIENT)
Dept: URGENT CARE | Age: 34
End: 2024-11-11
Payer: COMMERCIAL

## 2024-11-11 VITALS
WEIGHT: 270 LBS | TEMPERATURE: 99.2 F | OXYGEN SATURATION: 95 % | DIASTOLIC BLOOD PRESSURE: 102 MMHG | HEART RATE: 105 BPM | SYSTOLIC BLOOD PRESSURE: 146 MMHG | BODY MASS INDEX: 38.74 KG/M2

## 2024-11-11 DIAGNOSIS — J40 BRONCHITIS: Primary | ICD-10-CM

## 2024-11-11 DIAGNOSIS — R09.81 HEAD CONGESTION: ICD-10-CM

## 2024-11-11 DIAGNOSIS — J02.9 SORE THROAT: ICD-10-CM

## 2024-11-11 DIAGNOSIS — R05.1 ACUTE COUGH: ICD-10-CM

## 2024-11-11 LAB
POC BINAX EXPIRATION: NEGATIVE
POC BINAX NOW COVID SERIAL NUMBER: NEGATIVE
POC RAPID INFLUENZA A: NEGATIVE
POC RAPID INFLUENZA B: NEGATIVE
POC RAPID STREP: NEGATIVE
POC SARS-COV-2 AG BINAX: NORMAL

## 2024-11-11 PROCEDURE — 71046 X-RAY EXAM CHEST 2 VIEWS: CPT

## 2024-11-11 RX ORDER — AZITHROMYCIN 250 MG/1
TABLET, FILM COATED ORAL
Qty: 6 TABLET | Refills: 0 | Status: SHIPPED | OUTPATIENT
Start: 2024-11-11 | End: 2024-11-16

## 2024-11-11 RX ORDER — ALBUTEROL SULFATE 90 UG/1
2 INHALANT RESPIRATORY (INHALATION) EVERY 4 HOURS PRN
Qty: 8.5 G | Refills: 0 | Status: SHIPPED | OUTPATIENT
Start: 2024-11-11 | End: 2025-11-11

## 2024-11-11 ASSESSMENT — ENCOUNTER SYMPTOMS
COUGH: 1
FEVER: 1
HEADACHES: 1
SORE THROAT: 1

## 2024-11-11 NOTE — PROGRESS NOTES
Subjective   Patient ID: Narciso Coates is a 34 y.o. male. They present today with a chief complaint of Headache (Pt states severe headache then x3days earache, congestion and cough. ).    History of Present Illness  Patient is a 34-year-old male with no relevant past medical history presents urgent care today with flulike symptoms.  He notes symptoms started Friday and specifically endorses headache, sore throat, nasal congestion, productive cough, earaches and fever/chills.  He is been treating his symptoms with over-the-counter medication without significant relief.  He denies chest pain or shortness of breath.  No other complaints or concerns mention at this time.      History provided by:  Patient  Headache  Associated symptoms: congestion, cough, fever and sore throat        Past Medical History  Allergies as of 11/11/2024    (No Known Allergies)       (Not in a hospital admission)         Past Medical History:   Diagnosis Date    Hypertension        Past Surgical History:   Procedure Laterality Date    ABDOMINAL SURGERY      gallbladder removal    CARDIAC CATHETERIZATION N/A 11/20/2023    Procedure: Left Heart Cath;  Surgeon: Eliud Bartholomew MD;  Location: [facility] Cardiac Cath [facility];  Service: Cardiovascular;  Laterality: N/A;        reports that he has never smoked. He has quit using smokeless tobacco.  His smokeless tobacco use included chew. He reports that he does not currently use alcohol. He reports that he does not use drugs.    Review of Systems  Review of Systems   Constitutional:  Positive for fever.   HENT:  Positive for congestion and sore throat.    Respiratory:  Positive for cough.    Neurological:  Positive for headaches.                                  Objective    Vitals:    11/11/24 1401   BP: (!) 146/102   Pulse: 105   Temp: 37.3 °C (99.2 °F)   SpO2: 95%   Weight: 122 kg (270 lb)     No LMP for male patient.    Physical Exam  Vitals and nursing note reviewed.   Constitutional:       General:  He is not in acute distress.     Appearance: Normal appearance. He is not ill-appearing, toxic-appearing or diaphoretic.   HENT:      Head: Normocephalic and atraumatic.      Right Ear: Tympanic membrane, ear canal and external ear normal.      Left Ear: Tympanic membrane, ear canal and external ear normal.      Nose: Congestion present.      Mouth/Throat:      Mouth: Mucous membranes are moist.      Pharynx: Posterior oropharyngeal erythema present. No oropharyngeal exudate.   Eyes:      Extraocular Movements: Extraocular movements intact.      Conjunctiva/sclera: Conjunctivae normal.      Pupils: Pupils are equal, round, and reactive to light.   Cardiovascular:      Rate and Rhythm: Regular rhythm. Tachycardia present.      Pulses: Normal pulses.      Heart sounds: Normal heart sounds.   Pulmonary:      Effort: Pulmonary effort is normal. No respiratory distress.      Breath sounds: Normal breath sounds. No stridor. No wheezing, rhonchi or rales.   Chest:      Chest wall: No tenderness.   Musculoskeletal:         General: Normal range of motion.      Cervical back: Normal range of motion and neck supple.   Skin:     General: Skin is warm and dry.      Capillary Refill: Capillary refill takes less than 2 seconds.   Neurological:      General: No focal deficit present.      Mental Status: He is alert and oriented to person, place, and time.   Psychiatric:         Mood and Affect: Mood normal.         Behavior: Behavior normal.         Procedures      Assessment/Plan   Allergies, medications, history, and pertinent labs/EKGs/Imaging reviewed by MIRELLA House.     Medical Decision Making  Patient is well appearing, afebrile, non toxic, not hypoxic, and appropriate for outpatient treatment and management at time of evaluation. Patient presents with flulike symptoms since Friday as described above. Differential includes but not limited to: COVID, influenza, streptococcal pharyngitis, pneumonia, other.  Rapid  strep, flu and covid are negative. Chest xray ordered.  Imaging independently reviewed by myself and interpreted as no acute cardiopulmonary process.  I discussed these findings with the patient.  Based on history, exam findings and lab results, symptoms are most likely consistent with a bronchitis.  A prescription for albuterol inhaler and azithromycin called into patient's pharmacy use as directed.  Also recommended close follow-up with PCP.  He is in agreement this plan.  Patient was discharged in stable condition.  All questions and concerns addressed.      Plan: Discussed differential with the patient. Patient voices understanding and is agreeable to close follow-up with their PCP in the next 2-3 days. They understand they should go to the emergency room immediately for any new, worsening or concerning symptoms. Patient understands return precautions and discharge instructions.      Orders and Diagnoses  There are no diagnoses linked to this encounter.    Medical Admin Record      Follow Up Instructions  No follow-ups on file.    Patient disposition: Home    Electronically signed by MIRELLA House  2:18 PM

## 2024-11-11 NOTE — PATIENT INSTRUCTIONS
You were seen at Urgent Care today for flulike symptoms.  Your flu, strep and COVID were negative.  Chest x-ray is also negative for pneumonia.  Please treat as discussed. Please take medications as prescribed. Monitor for red flags which we spoke about, If your symptoms change, worsen or become concerning in any way, please go to the emergency room immediately, otherwise you can followup with your PCP in 2-3 days as needed

## 2024-11-12 DIAGNOSIS — I25.10 CORONARY ARTERY DISEASE INVOLVING NATIVE CORONARY ARTERY OF NATIVE HEART WITHOUT ANGINA PECTORIS: Primary | ICD-10-CM

## 2024-11-12 RX ORDER — TICAGRELOR 90 MG/1
90 TABLET ORAL 2 TIMES DAILY
Qty: 60 TABLET | Refills: 0 | Status: SHIPPED | OUTPATIENT
Start: 2024-11-12

## 2025-01-14 PROBLEM — N20.0 CALCULUS OF KIDNEY: Status: ACTIVE | Noted: 2025-01-14

## 2025-01-14 PROBLEM — I10 BENIGN ESSENTIAL HTN: Status: ACTIVE | Noted: 2025-01-14

## 2025-01-14 PROBLEM — E66.9 OBESITY WITH BODY MASS INDEX 30 OR GREATER: Status: ACTIVE | Noted: 2025-01-14

## 2025-01-14 PROBLEM — E78.2 MIXED HYPERLIPIDEMIA: Status: ACTIVE | Noted: 2025-01-14

## 2025-01-17 ENCOUNTER — APPOINTMENT (OUTPATIENT)
Dept: PRIMARY CARE | Facility: CLINIC | Age: 35
End: 2025-01-17
Payer: COMMERCIAL

## 2025-01-17 ENCOUNTER — LAB (OUTPATIENT)
Dept: LAB | Facility: LAB | Age: 35
End: 2025-01-17
Payer: COMMERCIAL

## 2025-01-17 VITALS
OXYGEN SATURATION: 96 % | WEIGHT: 277.6 LBS | DIASTOLIC BLOOD PRESSURE: 86 MMHG | HEART RATE: 83 BPM | BODY MASS INDEX: 39.74 KG/M2 | SYSTOLIC BLOOD PRESSURE: 122 MMHG | HEIGHT: 70 IN

## 2025-01-17 DIAGNOSIS — R73.09 BLOOD GLUCOSE ABNORMAL: ICD-10-CM

## 2025-01-17 DIAGNOSIS — E55.9 VITAMIN D DEFICIENCY: ICD-10-CM

## 2025-01-17 DIAGNOSIS — R06.83 SNORING: ICD-10-CM

## 2025-01-17 DIAGNOSIS — I25.10 CORONARY ARTERY DISEASE INVOLVING NATIVE CORONARY ARTERY OF NATIVE HEART WITHOUT ANGINA PECTORIS: ICD-10-CM

## 2025-01-17 DIAGNOSIS — E78.2 MIXED HYPERLIPIDEMIA: ICD-10-CM

## 2025-01-17 DIAGNOSIS — Z00.00 ROUTINE ADULT HEALTH MAINTENANCE: ICD-10-CM

## 2025-01-17 DIAGNOSIS — Z00.00 ROUTINE ADULT HEALTH MAINTENANCE: Primary | ICD-10-CM

## 2025-01-17 DIAGNOSIS — I10 BENIGN ESSENTIAL HTN: ICD-10-CM

## 2025-01-17 DIAGNOSIS — G47.00 INSOMNIA, UNSPECIFIED TYPE: ICD-10-CM

## 2025-01-17 PROBLEM — T18.108A: Status: RESOLVED | Noted: 2024-03-08 | Resolved: 2025-01-17

## 2025-01-17 LAB
25(OH)D3 SERPL-MCNC: 7 NG/ML (ref 30–100)
ALBUMIN SERPL BCP-MCNC: 4.2 G/DL (ref 3.4–5)
ALP SERPL-CCNC: 69 U/L (ref 33–120)
ALT SERPL W P-5'-P-CCNC: 33 U/L (ref 10–52)
ANION GAP SERPL CALC-SCNC: 9 MMOL/L (ref 10–20)
AST SERPL W P-5'-P-CCNC: 19 U/L (ref 9–39)
BASOPHILS # BLD AUTO: 0.04 X10*3/UL (ref 0–0.1)
BASOPHILS NFR BLD AUTO: 0.6 %
BILIRUB SERPL-MCNC: 0.5 MG/DL (ref 0–1.2)
BUN SERPL-MCNC: 21 MG/DL (ref 6–23)
CALCIUM SERPL-MCNC: 8.9 MG/DL (ref 8.6–10.3)
CHLORIDE SERPL-SCNC: 108 MMOL/L (ref 98–107)
CHOLEST SERPL-MCNC: 106 MG/DL (ref 0–199)
CHOLESTEROL/HDL RATIO: 3.9
CO2 SERPL-SCNC: 28 MMOL/L (ref 21–32)
CREAT SERPL-MCNC: 1 MG/DL (ref 0.5–1.3)
EGFRCR SERPLBLD CKD-EPI 2021: >90 ML/MIN/1.73M*2
EOSINOPHIL # BLD AUTO: 0.16 X10*3/UL (ref 0–0.7)
EOSINOPHIL NFR BLD AUTO: 2.2 %
ERYTHROCYTE [DISTWIDTH] IN BLOOD BY AUTOMATED COUNT: 11.9 % (ref 11.5–14.5)
EST. AVERAGE GLUCOSE BLD GHB EST-MCNC: 108 MG/DL
GLUCOSE SERPL-MCNC: 101 MG/DL (ref 74–99)
HBA1C MFR BLD: 5.4 %
HCT VFR BLD AUTO: 42.7 % (ref 41–52)
HDLC SERPL-MCNC: 27.1 MG/DL
HGB BLD-MCNC: 14.8 G/DL (ref 13.5–17.5)
IMM GRANULOCYTES # BLD AUTO: 0.03 X10*3/UL (ref 0–0.7)
IMM GRANULOCYTES NFR BLD AUTO: 0.4 % (ref 0–0.9)
LDLC SERPL CALC-MCNC: 60 MG/DL
LYMPHOCYTES # BLD AUTO: 1.8 X10*3/UL (ref 1.2–4.8)
LYMPHOCYTES NFR BLD AUTO: 24.8 %
MCH RBC QN AUTO: 30.3 PG (ref 26–34)
MCHC RBC AUTO-ENTMCNC: 34.7 G/DL (ref 32–36)
MCV RBC AUTO: 88 FL (ref 80–100)
MONOCYTES # BLD AUTO: 0.45 X10*3/UL (ref 0.1–1)
MONOCYTES NFR BLD AUTO: 6.2 %
NEUTROPHILS # BLD AUTO: 4.78 X10*3/UL (ref 1.2–7.7)
NEUTROPHILS NFR BLD AUTO: 65.8 %
NON HDL CHOLESTEROL: 79 MG/DL (ref 0–149)
NRBC BLD-RTO: 0 /100 WBCS (ref 0–0)
PLATELET # BLD AUTO: 289 X10*3/UL (ref 150–450)
POTASSIUM SERPL-SCNC: 4 MMOL/L (ref 3.5–5.3)
PROT SERPL-MCNC: 6.3 G/DL (ref 6.4–8.2)
RBC # BLD AUTO: 4.88 X10*6/UL (ref 4.5–5.9)
SODIUM SERPL-SCNC: 141 MMOL/L (ref 136–145)
TRIGL SERPL-MCNC: 95 MG/DL (ref 0–149)
TSH SERPL-ACNC: 1.45 MIU/L (ref 0.44–3.98)
VLDL: 19 MG/DL (ref 0–40)
WBC # BLD AUTO: 7.3 X10*3/UL (ref 4.4–11.3)

## 2025-01-17 PROCEDURE — 82306 VITAMIN D 25 HYDROXY: CPT

## 2025-01-17 PROCEDURE — 83036 HEMOGLOBIN GLYCOSYLATED A1C: CPT

## 2025-01-17 PROCEDURE — 80061 LIPID PANEL: CPT

## 2025-01-17 PROCEDURE — 99204 OFFICE O/P NEW MOD 45 MIN: CPT | Performed by: NURSE PRACTITIONER

## 2025-01-17 PROCEDURE — 3008F BODY MASS INDEX DOCD: CPT | Performed by: NURSE PRACTITIONER

## 2025-01-17 PROCEDURE — 3074F SYST BP LT 130 MM HG: CPT | Performed by: NURSE PRACTITIONER

## 2025-01-17 PROCEDURE — 80053 COMPREHEN METABOLIC PANEL: CPT

## 2025-01-17 PROCEDURE — 1036F TOBACCO NON-USER: CPT | Performed by: NURSE PRACTITIONER

## 2025-01-17 PROCEDURE — 3079F DIAST BP 80-89 MM HG: CPT | Performed by: NURSE PRACTITIONER

## 2025-01-17 PROCEDURE — 84443 ASSAY THYROID STIM HORMONE: CPT

## 2025-01-17 PROCEDURE — 85025 COMPLETE CBC W/AUTO DIFF WBC: CPT

## 2025-01-17 ASSESSMENT — ENCOUNTER SYMPTOMS
TROUBLE SWALLOWING: 0
FEVER: 0
HEADACHES: 1
PALPITATIONS: 0
JOINT SWELLING: 0
LOSS OF SENSATION IN FEET: 0
COLOR CHANGE: 0
BACK PAIN: 0
SEIZURES: 0
CONSTIPATION: 0
DEPRESSION: 0
DIARRHEA: 0
OCCASIONAL FEELINGS OF UNSTEADINESS: 0
BRUISES/BLEEDS EASILY: 0
SHORTNESS OF BREATH: 0
WEAKNESS: 0
ABDOMINAL DISTENTION: 0
DIFFICULTY URINATING: 0
WOUND: 0
ADENOPATHY: 0
MYALGIAS: 0
CHILLS: 0
COUGH: 0
NAUSEA: 0
WHEEZING: 0
DIZZINESS: 0
ABDOMINAL PAIN: 0
FATIGUE: 0
EYE PAIN: 0

## 2025-01-17 ASSESSMENT — PATIENT HEALTH QUESTIONNAIRE - PHQ9
SUM OF ALL RESPONSES TO PHQ9 QUESTIONS 1 AND 2: 0
2. FEELING DOWN, DEPRESSED OR HOPELESS: NOT AT ALL
1. LITTLE INTEREST OR PLEASURE IN DOING THINGS: NOT AT ALL

## 2025-01-17 ASSESSMENT — COLUMBIA-SUICIDE SEVERITY RATING SCALE - C-SSRS
6. HAVE YOU EVER DONE ANYTHING, STARTED TO DO ANYTHING, OR PREPARED TO DO ANYTHING TO END YOUR LIFE?: NO
1. IN THE PAST MONTH, HAVE YOU WISHED YOU WERE DEAD OR WISHED YOU COULD GO TO SLEEP AND NOT WAKE UP?: NO
2. HAVE YOU ACTUALLY HAD ANY THOUGHTS OF KILLING YOURSELF?: NO

## 2025-01-17 NOTE — PROGRESS NOTES
Do you have:  Any eye problems:    N  2. Frequent nasal congestion or sneezing:  Y  3. Difficulty hearing:  N  4. Ear problems:   N  Are you troubled by:  5. Asthma or wheezing:   N  6. Frequent cough:   N  7. Shortness of breath:N  8. Hemoptysis: N  9. Hx of TB: N  Do you have or have you been told you had:  10. High blood pressure: Y  11. Heart disease: Y  12. Heart murmur: N  Do you ever have:  13. Chest pain or pressure with exertion: Y   14. Leg pains with walking up hill: N  15. Fast heartbeat or palpitations:N  16. Varicose veins: N  Do you have or are you troubled by:  17. Difficulty swallowing foods or liquids: N  18. Abdominal pains: N  19. Frequent indigestion or heartburn: N  20. Constipation: N  21. Diarrhea or loose stools: N  Has there been a definite change:  22. In weight recently: N  23. In bowel movements: N  Have you ever had or been told you have:  24. An ulcer: N  25. Black stools: N  26. Jaundice, hepatitis or liver problems: N  27. Gallstones or gallbladder problems: Y  28. Stomach or intestinal problems: N  Have you ever:  29. Vomited blood : N  30. Blood in bowel movements: N  31. Been anemic or been treated for blood problems: N  32. Had sickle cell trait or anemia: N  33. Been refused as a blood donor:   Have you had or do you have:  34. Problems with your kidney, bladder, or prostate: Y  35. Loss of control of your urine: N  36. Pain or burning with urination: N  37. Blood in your urine: N  38. Trouble starting flow of urine: N  39. Frequent urination at night: N  Have you ever been treated for or told you had:  40. Venereal disease: N  Do you have:  41. Any skin problems: N  42. Diabetes: N  43. Thyroid disease: N  Are you troubled by:  44. Frequent back pain: N  45. Pain or swelling around joints: N  Have you ever:  46. Broken any bones: Y  Are you troubled by:  47. Frequent headaches: Y  48. Dizziness: N  49. Had Seizures or convulsions: N  50. Temporarily lost control of your hand or  "foot : N   51. Had a stroke or been paralyzed : N  52. Temporarily lost your ability to speak: N  53. Fainted or lost consciousness: N  Have you ever had:  54. Hallucinations: N  55. Much trouble with Nervousness: N  56. Do you take medications for your nerves: N  57. Trouble falling asleep or staying asleep: N  58. Do you feel tired even after a good night sleep: N  59. Do you often feel down in the dumps or depressed: N  60. Do you often feel like crying without any reason: N  61. Do you think that you may be using alcohol excessively: N  62. Do you use any street drugs : N     Do have any other medical problems that are concerning to you :      Subjective   Patient ID: Narciso Coates is a 34 y.o. male who presents for Annual Exam and Establish Care.    Patient seen today in order to establish primary care.  Patient follows routinely with cardiology services due to an NSTEMI with subsequent stent placement in 2023 but reports not having seen a primary care provider \"ever \".  Patient lives with his spouse and reports a good support system.  He currently works full-time in a factory as a .  He reports being around smoke/chemicals frequently but does not use any spiders/respiratory protective equipment.  He also does not use any ear protection despite being around loud noises frequently.  Although patient's job is very physically active he reports being relatively sedentary outside of work.  No report of shortness of breath or chest pain upon exertion.  He does not monitor his weights or vital signs at home.  Patient denies any issues with appetite, staying hydrated, bowel or bladder.  He reports that he does not follow a heart healthy diet although he reports that his wife \"tries \".  Patient does not smoke and he will only occasional drink alcohol on the weekends.  Patient denies any issues with mood but does report poor sleep/insomnia.  He does work second shift so corrupted sleep is reported.  " Spouse also reports that patient snores.  He is agreeable to undergo a sleep apnea test at this time.  Patient denies any current issues with vision but does report he feels like he has a current dental concerns.  He states that he will follow-up with a dentist in the near future.  Medications reviewed.  No other acute concerns voiced at this time.       Current Outpatient Medications on File Prior to Visit   Medication Sig Dispense Refill    albuterol (ProAir HFA) 90 mcg/actuation inhaler Inhale 2 puffs every 4 hours if needed for wheezing or shortness of breath. 8.5 g 0    aspirin 81 mg chewable tablet Chew 1 tablet (81 mg) once daily. 90 tablet 3    atorvastatin (Lipitor) 80 mg tablet Take 1 tablet (80 mg) by mouth once daily at bedtime. 90 tablet 3    losartan (Cozaar) 100 mg tablet Take 1 tablet (100 mg) by mouth once daily. 90 tablet 3    metoprolol succinate XL (Toprol-XL) 50 mg 24 hr tablet Take 1 tablet (50 mg) by mouth once daily. Do not crush or chew. 90 tablet 3    tiZANidine (Zanaflex) 4 mg tablet Take 1 tablet (4 mg) by mouth every 6 hours if needed for muscle spasms for up to 10 days. 30 tablet 0    [DISCONTINUED] Brilinta 90 mg tablet TAKE ONE TABLET BY MOUTH TWO TIMES A DAY (Patient not taking: Reported on 1/17/2025) 60 tablet 0     No current facility-administered medications on file prior to visit.       Past Medical History:   Diagnosis Date    Hypertension         Past Surgical History:   Procedure Laterality Date    ABDOMINAL SURGERY      gallbladder removal    CARDIAC CATHETERIZATION N/A 11/20/2023    Procedure: Left Heart Cath;  Surgeon: Eliud Bartholomew MD;  Location: [facility] Cardiac Cath Lab;  Service: Cardiovascular;  Laterality: N/A;        No family history on file.     Review of Systems   Constitutional:  Negative for chills, fatigue and fever.   HENT:  Positive for dental problem. Negative for trouble swallowing.         Positive for tooth ache   Eyes:  Negative for pain and visual  "disturbance.        Wears glasses   Respiratory:  Negative for cough, shortness of breath and wheezing.    Cardiovascular:  Negative for chest pain, palpitations and leg swelling.        See HPI   Gastrointestinal:  Negative for abdominal distention, abdominal pain, constipation, diarrhea and nausea.   Endocrine: Negative for cold intolerance and heat intolerance.   Genitourinary:  Negative for difficulty urinating.   Musculoskeletal:  Negative for back pain, gait problem, joint swelling and myalgias.   Skin:  Negative for color change, pallor, rash and wound.   Allergic/Immunologic: Negative for environmental allergies and food allergies.   Neurological:  Positive for headaches. Negative for dizziness, seizures and weakness.        Positive for occasional headaches   Hematological:  Negative for adenopathy. Does not bruise/bleed easily.   Psychiatric/Behavioral:  Negative for behavioral problems.    All other systems reviewed and are negative.      Objective   /86   Pulse 83   Ht 1.778 m (5' 10\")   Wt 126 kg (277 lb 9.6 oz)   SpO2 96%   BMI 39.83 kg/m²     Physical Exam  Constitutional:       General: He is not in acute distress.     Appearance: Normal appearance. He is not toxic-appearing.   HENT:      Head: Normocephalic and atraumatic.      Right Ear: Tympanic membrane, ear canal and external ear normal.      Left Ear: Tympanic membrane, ear canal and external ear normal.      Nose: Nose normal.      Mouth/Throat:      Mouth: Mucous membranes are moist.      Pharynx: Oropharynx is clear.   Eyes:      Extraocular Movements: Extraocular movements intact.      Conjunctiva/sclera: Conjunctivae normal.      Pupils: Pupils are equal, round, and reactive to light.   Cardiovascular:      Rate and Rhythm: Normal rate and regular rhythm.      Pulses: Normal pulses.      Heart sounds: Normal heart sounds. No murmur heard.  Pulmonary:      Effort: Pulmonary effort is normal.      Breath sounds: Normal breath " "sounds. No wheezing.   Abdominal:      General: Bowel sounds are normal.      Palpations: Abdomen is soft.   Musculoskeletal:         General: No swelling. Normal range of motion.      Cervical back: Normal range of motion and neck supple.   Skin:     General: Skin is warm and dry.   Neurological:      General: No focal deficit present.      Mental Status: He is alert and oriented to person, place, and time. Mental status is at baseline.      Cranial Nerves: No cranial nerve deficit.      Motor: No weakness.   Psychiatric:         Mood and Affect: Mood normal.         Behavior: Behavior normal.         Thought Content: Thought content normal.         Judgment: Judgment normal.         Assessment/Plan   Problem List Items Addressed This Visit             ICD-10-CM    Coronary artery disease without angina pectoris I25.10     History of NSTEMI s/p coronary angiography 11/20/2023.  Patient was found to have \"100% subtotally occluded proximal LAD (faint R-L collaterals) s/p ANNIKA as well as PTCA to distal Lcx as it became occluded from thromboembolism from prox LAD during PCI. EF 40-45% with apical hypokinesis\".  He now follows with cardiology annually         Relevant Orders    Comprehensive Metabolic Panel    CBC and Auto Differential    Mixed hyperlipidemia E78.2     Patient continues on daily statin without issue.  Will continue monitoring fasting cholesterol panel routinely         Benign essential HTN I10     Patient to continue current medication regiment.  Will continue to monitor patient's vital signs at subsequent visits.  Heart healthy diet encouraged.  Provider to be notified of any new cardiac concerns.         Snoring R06.83     Patient agreeable to undergo at home sleep study to evaluate for obstructive sleep apnea.         Relevant Orders    Home sleep apnea test (HSAT)    Insomnia G47.00     Patient works second set and reports split sleep/routine sleep disturbances.  Good sleep hygiene encouraged.      "    Routine adult health maintenance - Primary Z00.00     Routine blood work ordered today for assessment purposes.  Will continue to monitor as appropriate.         Relevant Orders    Comprehensive Metabolic Panel    TSH with reflex to Free T4 if abnormal    Lipid Panel    CBC and Auto Differential     Other Visit Diagnoses         Codes    Blood glucose abnormal     R73.09    Relevant Orders    Hemoglobin A1C    Vitamin D deficiency     E55.9    Relevant Orders    Vitamin D 25-Hydroxy,Total (for eval of Vitamin D levels)

## 2025-01-17 NOTE — ASSESSMENT & PLAN NOTE
Routine blood work ordered today for assessment purposes.  Will continue to monitor as appropriate.

## 2025-01-17 NOTE — ASSESSMENT & PLAN NOTE
Patient continues on daily statin without issue.  Will continue monitoring fasting cholesterol panel routinely

## 2025-01-17 NOTE — ASSESSMENT & PLAN NOTE
"History of NSTEMI s/p coronary angiography 11/20/2023.  Patient was found to have \"100% subtotally occluded proximal LAD (faint R-L collaterals) s/p ANNIKA as well as PTCA to distal Lcx as it became occluded from thromboembolism from prox LAD during PCI. EF 40-45% with apical hypokinesis\".  He now follows with cardiology annually  "

## 2025-01-17 NOTE — ASSESSMENT & PLAN NOTE
Patient works second set and reports split sleep/routine sleep disturbances.  Good sleep hygiene encouraged.

## 2025-01-17 NOTE — ASSESSMENT & PLAN NOTE
Patient to continue current medication regiment.  Will continue to monitor patient's vital signs at subsequent visits.  Heart healthy diet encouraged.  Provider to be notified of any new cardiac concerns.

## 2025-01-17 NOTE — PATIENT INSTRUCTIONS
[facility] in [place], [place]  Address: [address] [place] [address] [address], [place] [address]  Phone: [phone]  Please call to arrange an overnight sleep study to assess for sleep apnea     Orders are in place for you to have fasting blood work completed. Please do not eat or drink 8 hours prior to having your blood drawn.   You may have your blood work completed in this building through [facility] ([address], [place] [address]).  Hours:   Monday - Friday: 7:30 a.m. - 5 p.m. and Saturday: 8 a.m. - 12 p.m.  Phone:  [phone]     Please arrange for routine follow up in 12 months. You may schedule on-line through [employer] or call our office at [phone].

## 2025-02-24 NOTE — PROGRESS NOTES
David Hernadez MA P P [facility] Schedulers  Caller: Unspecified (11 months ago)                  - Repeat the upper endoscopic ultrasound in 1 year                         for surveillance based on pathology results.  Attending Participation:       I personally performed the entire procedure.  Kevin Carballo MD  3/5/2024 11:07:24 AM   Pharmacy Medication History Review    Narciso Coates is a 33 y.o. male admitted for No Principal Problem: There is no principal problem currently on the Problem List. Please update the Problem List and refresh.. Pharmacy reviewed the patient's [id number] medications and allergies for accuracy.    The list below reflectives the updated PTA list. Please review each medication in order reconciliation for additional clarification and justification.  (Not in a hospital admission)       The list below reflectives the updated allergy list. Please review each documented allergy for additional clarification and justification.  Allergies  Reviewed by Afshan Serra RN on 11/17/2023   No Known Allergies         Below are additional concerns with the patient's PTA list.      Meron Lorenzo CPhT

## 2025-05-05 DIAGNOSIS — I21.4 NSTEMI (NON-ST ELEVATED MYOCARDIAL INFARCTION) (MULTI): ICD-10-CM

## 2025-05-06 RX ORDER — NAPROXEN SODIUM 220 MG/1
81 TABLET, FILM COATED ORAL DAILY
Qty: 90 TABLET | Refills: 1 | Status: SHIPPED | OUTPATIENT
Start: 2025-05-06 | End: 2025-11-02

## (undated) DEVICE — INTRODUCER SHEATH, GLIDESHEATH, 6FR 10CM

## (undated) DEVICE — CATHETER, BALLOON, NC EUPHORA NONCOMPLIANT 3.0 X 15 X 142CM

## (undated) DEVICE — CATHETER, BALLOON, NC EUPHORA NONCOMPLIANT 2.25 X 12 X 142CM

## (undated) DEVICE — INFLATION DEVICE, BASIXCOMPAX, 30 ATM/BAR, 20ML  MAP152

## (undated) DEVICE — GUIDEWIRE, INQUIRE, J TIP, .035 X 210CM, FIXED CORE, DIAGNOSTIC

## (undated) DEVICE — GUIDEWIRE, RUN THROUGH WIRE, 180CM

## (undated) DEVICE — CATHETER, BALLOON, NC EUPHORA NONCOMPLIANT 2.5 X 15 X 142CM

## (undated) DEVICE — ANGIO KIT, LEFT HEART, LF, CUSTOM

## (undated) DEVICE — SHEATH, GLIDESHEATH, SLENDER, 6FR 10CM

## (undated) DEVICE — CATHETER, GUIDING, LAUNCHER, 6FR EBU 3.5

## (undated) DEVICE — CATHETER, OPTITORQUE, 5FR, TIG1, 1H/100CM

## (undated) DEVICE — CATHETER, BALLOON DILATION, EUPHORA SEMICOMPLIANT 2.0  X 12 MM X 142CM

## (undated) DEVICE — TR BAND, RADIAL COMPRESSION, STANDARD, 24CM

## (undated) DEVICE — CATHETER, GUIDING, LAUNCHER, 6FR, JL 3.5

## (undated) DEVICE — CATHETER, BALLOON, NC EUPHORA NONCOMPLIANT 2.5 X 12 X 142CM

## (undated) DEVICE — NEEDLE, ENTRY, PERCUTANEOUS, 21 G X 2.5 CM

## (undated) DEVICE — CATHETER, BALLOON, NC EUPHORA NONCOMPLIANT 2.5 X 27 X 142CM

## (undated) DEVICE — GUIDEWIRE, HI-TORQUE PILOT 50, .014/190CM, STRAIGHT"

## (undated) DEVICE — CATHETER, BALLOON, NC EUPHORA NONCOMPLIANT 3.0 X 20 X 142CM